# Patient Record
Sex: FEMALE | Race: WHITE | NOT HISPANIC OR LATINO | Employment: UNEMPLOYED | URBAN - METROPOLITAN AREA
[De-identification: names, ages, dates, MRNs, and addresses within clinical notes are randomized per-mention and may not be internally consistent; named-entity substitution may affect disease eponyms.]

---

## 2017-03-08 ENCOUNTER — OFFICE VISIT (OUTPATIENT)
Dept: FAMILY MEDICINE CLINIC | Facility: CLINIC | Age: 37
End: 2017-03-08

## 2017-03-08 VITALS
RESPIRATION RATE: 16 BRPM | DIASTOLIC BLOOD PRESSURE: 62 MMHG | TEMPERATURE: 98.2 F | SYSTOLIC BLOOD PRESSURE: 118 MMHG | OXYGEN SATURATION: 98 % | WEIGHT: 204 LBS | HEIGHT: 64 IN | BODY MASS INDEX: 34.83 KG/M2 | HEART RATE: 62 BPM

## 2017-03-08 DIAGNOSIS — R11.2 NON-INTRACTABLE VOMITING WITH NAUSEA, UNSPECIFIED VOMITING TYPE: ICD-10-CM

## 2017-03-08 DIAGNOSIS — R53.82 CHRONIC FATIGUE: ICD-10-CM

## 2017-03-08 DIAGNOSIS — F32.A ANXIETY AND DEPRESSION: ICD-10-CM

## 2017-03-08 DIAGNOSIS — Z00.00 ANNUAL PHYSICAL EXAM: Primary | ICD-10-CM

## 2017-03-08 DIAGNOSIS — F41.9 ANXIETY AND DEPRESSION: ICD-10-CM

## 2017-03-08 DIAGNOSIS — G43.009 MIGRAINE WITHOUT AURA AND WITHOUT STATUS MIGRAINOSUS, NOT INTRACTABLE: ICD-10-CM

## 2017-03-08 DIAGNOSIS — Z82.0 FAMILY HISTORY OF HUNTINGTON'S DISEASE: ICD-10-CM

## 2017-03-08 LAB
B-HCG UR QL: NEGATIVE
INTERNAL NEGATIVE CONTROL: NEGATIVE
INTERNAL POSITIVE CONTROL: POSITIVE
Lab: NORMAL

## 2017-03-08 PROCEDURE — 99385 PREV VISIT NEW AGE 18-39: CPT | Performed by: PHYSICIAN ASSISTANT

## 2017-03-08 PROCEDURE — 81025 URINE PREGNANCY TEST: CPT | Performed by: PHYSICIAN ASSISTANT

## 2017-03-08 RX ORDER — BACLOFEN 10 MG/1
10 TABLET ORAL 3 TIMES DAILY
Qty: 60 TABLET | Refills: 2 | Status: SHIPPED | OUTPATIENT
Start: 2017-03-08 | End: 2017-04-05 | Stop reason: SDUPTHER

## 2017-03-08 RX ORDER — TRAZODONE HYDROCHLORIDE 50 MG/1
50 TABLET ORAL NIGHTLY
Qty: 30 TABLET | Refills: 2 | Status: SHIPPED | OUTPATIENT
Start: 2017-03-08 | End: 2017-04-05 | Stop reason: DRUGHIGH

## 2017-03-08 RX ORDER — ONDANSETRON 4 MG/1
4 TABLET, FILM COATED ORAL EVERY 8 HOURS PRN
Qty: 15 TABLET | Refills: 0 | Status: SHIPPED | OUTPATIENT
Start: 2017-03-08 | End: 2017-04-05 | Stop reason: SDUPTHER

## 2017-03-08 NOTE — PROGRESS NOTES
Subjective   Ru Rico is a 36 y.o. female.   Chief Complaint   Patient presents with   • Annual Exam     Get established. Patient is fasting for labs.        History of Present Illness     Ru is a 36-year-old female who presents to establish as new patient at today's office visit.  She is also here for annual physical examination.  I have reviewed her health history form with her and will try to obtain her prior medical records for my review.  Ru has a history of anxiety and depression.  Currently not taking any medications.  She has a history of reflux which she is using omeprazole daily.  Ru has a history of asthma and will use her rescue inhaler as needed.  Ru sees, Dr. Jimenez for her gynecological needs.  Last LMP was 2017.  Ru recently had a cholecystectomy in 2016.  She has a history of kidney stones with stent placement with Dr. Jamar Doe in 2016. Ru has been having headaches 3-4 times a week.  Describes the headaches as a throbbing sensation especially by eyes.  The headaches may last a few hours to days.  She has had photophobia,phonophobia,hard to get out of bed,motion sickness,nausea and vomiting.  She has been taking OTC  mg as needed.  She has not had a migraine workup or Neurology appointments.  Ru denied any head injury/tramua. Ru's father,brother and paternal grandfather have been diagnosed with [place]'s disease.  She has not had any testing for this disease. Sleep and dark room helps her headaches.  Last eye exam was February with normal results.   Caffeine is rare. She has increased stress at home. 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  Her grandmother got severely ill due to GI bleeding and was placed in hospice care in January.  She  a few weeks later.  States she is still trying to get over the grieving process.  Her  is not supportive as he should be.  She has not seen a  "therapist or grief counselor for her grandmother's passing.  Ru has been on Prozac, Wellbutrin,Celexa,hydroxyzine and Lexapro in past without relief of anxiety and depression.  Denied any suicidal or homicidal thoughts. Ru denied any chest pain, shortness of air, dizziness, vision changes, upper respiratory symptoms or swelling of her ankles. Denied any injury/trauma to head. Her appetite has been slightly healthy and sleep has been restless due to always thinking.  She has tried over-the-counter melatonin and Benadryl for sleep without relief of her symptoms.      The following portions of the patient's history were reviewed and updated as appropriate: allergies, current medications, past family history, past medical history, past social history and past surgical history.    Review of Systems   Constitutional: Negative.  Negative for chills, fatigue and fever.   Eyes: Positive for photophobia. Negative for visual disturbance.   Respiratory: Negative.  Negative for cough, shortness of breath and wheezing.    Cardiovascular: Negative.  Negative for chest pain, palpitations and leg swelling.   Gastrointestinal: Positive for nausea and vomiting. Negative for constipation and diarrhea.   Endocrine: Negative.    Genitourinary: Negative.    Musculoskeletal: Negative.    Skin: Negative.    Allergic/Immunologic: Negative.    Neurological: Positive for headaches. Negative for dizziness and light-headedness.   Hematological: Negative.    Psychiatric/Behavioral: Negative for sleep disturbance and suicidal ideas. The patient is nervous/anxious.    All other systems reviewed and are negative.    Vitals:    03/08/17 0802   BP: 118/62   BP Location: Right arm   Patient Position: Sitting   Cuff Size: Adult   Pulse: 62   Resp: 16   Temp: 98.2 °F (36.8 °C)   TempSrc: Oral   SpO2: 98%   Weight: 204 lb (92.5 kg)   Height: 64\" (162.6 cm)     PHQ-9 Depression Screening 3/8/2017   Little interest or pleasure in doing things 2 "   Feeling down, depressed, or hopeless 1   Trouble falling or staying asleep, or sleeping too much 3   Feeling tired or having little energy 3   Poor appetite or overeating 3   Feeling bad about yourself - or that you are a failure or have let yourself or your family down 2   Trouble concentrating on things, such as reading the newspaper or watching television 1   Moving or speaking so slowly that other people could have noticed. Or the opposite - being so fidgety or restless that you have been moving around a lot more than usual 3   Thoughts that you would be better off dead, or of hurting yourself in some way 0   PHQ-9 Total Score 18     Body mass index is 35.02 kg/(m^2).    No Known Allergies      Objective   Physical Exam   Constitutional: She is oriented to person, place, and time. Vital signs are normal. She appears well-developed and well-nourished.   HENT:   Head: Normocephalic and atraumatic.   Right Ear: Hearing, tympanic membrane, external ear and ear canal normal.   Left Ear: Hearing, tympanic membrane, external ear and ear canal normal.   Nose: Nose normal. Right sinus exhibits no maxillary sinus tenderness and no frontal sinus tenderness. Left sinus exhibits no maxillary sinus tenderness and no frontal sinus tenderness.   Mouth/Throat: Uvula is midline, oropharynx is clear and moist and mucous membranes are normal.   Eyes: Conjunctivae, EOM and lids are normal. Pupils are equal, round, and reactive to light.   Fundoscopic exam:       The right eye shows no papilledema.        The left eye shows no papilledema.   Neck: Trachea normal and phonation normal. Neck supple. No edema present. No thyromegaly present.   Cardiovascular: Normal rate, regular rhythm, S1 normal, S2 normal, normal heart sounds and normal pulses.    Pulmonary/Chest: Effort normal and breath sounds normal.   Abdominal: Soft. Normal appearance, normal aorta and bowel sounds are normal. There is no hepatomegaly. There is no tenderness.    Lymphadenopathy:     She has no cervical adenopathy.   Neurological: She is alert and oriented to person, place, and time. She has normal strength. No cranial nerve deficit or sensory deficit.   Reflex Scores:       Patellar reflexes are 2+ on the right side and 2+ on the left side.  Skin: Skin is warm, dry and intact.   Psychiatric: Her speech is normal and behavior is normal. Judgment and thought content normal. Her mood appears anxious. Cognition and memory are normal. She exhibits a depressed mood.   Crying off and on       Results for orders placed or performed in visit on 03/08/17   POC Pregnancy, Urine   Result Value Ref Range    HCG, Urine, QL Negative Negative    Lot Number [id number]     Internal Positive Control Positive     Internal Negative Control Negative      Assessment/Plan   Ru was seen today for annual exam.    Diagnoses and all orders for this visit:    Annual physical exam  -     CBC & Differential; Future  -     Comprehensive Metabolic Panel; Future  -     Lipid Panel With LDL / HDL Ratio; Future  -     CBC & Differential  -     Comprehensive Metabolic Panel  -     Lipid Panel With LDL / HDL Ratio    Anxiety and depression  -     traZODone (DESYREL) 50 MG tablet; Take 1 tablet by mouth Every Night.    Migraine without aura and without status migrainosus, not intractable  -     MRI Brain Without Contrast; Future  -     Ambulatory Referral to Neurology  -     POC Pregnancy, Urine  -     ondansetron (ZOFRAN) 4 MG tablet; Take 1 tablet by mouth Every 8 (Eight) Hours As Needed for Nausea or Vomiting.  -     baclofen (LIORESAL) 10 MG tablet; Take 1 tablet by mouth 3 (Three) Times a Day. As needed    Non-intractable vomiting with nausea, unspecified vomiting type  -     MRI Brain Without Contrast; Future  -     POC Pregnancy, Urine    Chronic fatigue  -     Vitamin B12; Future  -     Folate; Future  -     Vitamin D 25 Hydroxy; Future  -     Thyroid Panel With TSH; Future  -     POC Pregnancy,  Urine  -     Vitamin B12  -     Folate  -     Vitamin D 25 Hydroxy  -     Thyroid Panel With TSH    Family history of Lindsey's disease  -     Ambulatory Referral to Neurology      1.  New annual physical examination: I have reviewed Ru's health history form with her at today's office visit.  I will try to obtain her medical records for my review.  She is fasting at today's office visit will have a CBC, CMP and a lipid profile.  Ru will be notified of test results when completed.  2.  New anxiety with depression: I have reviewed her depression screening test at today's office visit.  I will start Ru on generic trazodone 50 mg to take at bedtime.  I've asked her to return to office in 3-4 weeks for reevaluation.  I'll consider possible referral to psychiatrist/therapist in future if warranted.  Encouraged her to seek grief counseling through the hospice organization due to her recent grandmother's passing of Dementia.  3.  New fatigue: Ru has been having fatigue symptoms off and on for the past 3-6 months.  Ru will have a in office vitamin B12/folate, vitamin D level and thyroid profile with TSH collected at today's office visit.  She'll be notified of test results when completed.  4.  New migraine headaches with vomiting/nausea and family history of [place]'s disease: Ru has been having frequent headaches.  I will schedule an MRI of brain without contrast for further evaluation.  Unable to start Topamax at this time due to her birth control medication interfering with the Topamax.  I have prescribed generic baclofen and Zofran prescriptions to pharmacy.  I'll consider possible Imitrex medication after laboratory results are normal.  I've asked her to start over-the-counter magnesium 1 tablet daily.  Continue her over-the-counter ibuprofen for now.  Ru has a strong family history of Lindsey Disease  with her father and brother.  She has symptoms of migraines.  I will schedule a referral to  neurology for further evaluation.  She will be notified of test results when completed.    Dragon transcription disclaimer    Much of this encounter note is an electronic transcription/translation of spoken language to printed text.  The electronic translation of spoken language may permit erroneous, or at times, nonsensical words or phrases to be inadvertently transcribed.  Although I have reviewed the note for such errors, some may still exist.    Ruthy Rubin PA-C  Family Practice        Dragon transcription disclaimer    Much of this encounter note is an electronic transcription/translation of spoken language to printed text.  The electronic translation of spoken language may permit erroneous, or at times, nonsensical words or phrases to be inadvertently transcribed.  Although I have reviewed the note for such errors, some may still exist.    Ruthy Rubin PA-C  Family Practice

## 2017-03-09 ENCOUNTER — TELEPHONE (OUTPATIENT)
Dept: FAMILY MEDICINE CLINIC | Facility: CLINIC | Age: 37
End: 2017-03-09

## 2017-03-09 LAB
25(OH)D3+25(OH)D2 SERPL-MCNC: 18.8 NG/ML
ALBUMIN SERPL-MCNC: 4 G/DL (ref 3.5–5.2)
ALBUMIN/GLOB SERPL: 1.1 G/DL
ALP SERPL-CCNC: 81 U/L (ref 40–129)
ALT SERPL-CCNC: 16 U/L (ref 5–33)
AST SERPL-CCNC: 22 U/L (ref 5–32)
BASOPHILS # BLD AUTO: 0.06 10*3/MM3 (ref 0–0.2)
BASOPHILS NFR BLD AUTO: 0.7 % (ref 0–2)
BILIRUB SERPL-MCNC: 0.5 MG/DL (ref 0.2–1.2)
BUN SERPL-MCNC: 12 MG/DL (ref 6–20)
BUN/CREAT SERPL: 15.4 (ref 7–25)
CALCIUM SERPL-MCNC: 9.3 MG/DL (ref 8.6–10.5)
CHLORIDE SERPL-SCNC: 97 MMOL/L (ref 98–107)
CHOLEST SERPL-MCNC: 205 MG/DL (ref 0–200)
CO2 SERPL-SCNC: 24.4 MMOL/L (ref 22–29)
CREAT SERPL-MCNC: 0.78 MG/DL (ref 0.57–1)
EOSINOPHIL # BLD AUTO: 0.17 10*3/MM3 (ref 0.1–0.3)
EOSINOPHIL NFR BLD AUTO: 1.9 % (ref 0–4)
ERYTHROCYTE [DISTWIDTH] IN BLOOD BY AUTOMATED COUNT: 13.7 % (ref 11.5–14.5)
FOLATE SERPL-MCNC: 7.83 NG/ML (ref 4.78–24.2)
FT4I SERPL CALC-MCNC: 2 (ref 1.2–4.9)
GLOBULIN SER CALC-MCNC: 3.5 GM/DL
GLUCOSE SERPL-MCNC: 87 MG/DL (ref 65–99)
HCT VFR BLD AUTO: 42.4 % (ref 37–47)
HDLC SERPL-MCNC: 67 MG/DL (ref 40–60)
HGB BLD-MCNC: 13.3 G/DL (ref 12–16)
IMM GRANULOCYTES # BLD: 0.04 10*3/MM3 (ref 0–0.03)
IMM GRANULOCYTES NFR BLD: 0.4 % (ref 0–0.5)
LDLC SERPL CALC-MCNC: 87 MG/DL (ref 0–100)
LDLC/HDLC SERPL: 1.3 {RATIO}
LYMPHOCYTES # BLD AUTO: 2.15 10*3/MM3 (ref 0.6–4.8)
LYMPHOCYTES NFR BLD AUTO: 24.1 % (ref 20–45)
MCH RBC QN AUTO: 26.4 PG (ref 27–31)
MCHC RBC AUTO-ENTMCNC: 31.4 G/DL (ref 31–37)
MCV RBC AUTO: 84.1 FL (ref 81–99)
MONOCYTES # BLD AUTO: 0.67 10*3/MM3 (ref 0–1)
MONOCYTES NFR BLD AUTO: 7.5 % (ref 3–8)
NEUTROPHILS # BLD AUTO: 5.84 10*3/MM3 (ref 1.5–8.3)
NEUTROPHILS NFR BLD AUTO: 65.4 % (ref 45–70)
NRBC BLD AUTO-RTO: 0 /100 WBC (ref 0–0)
PLATELET # BLD AUTO: 359 10*3/MM3 (ref 140–500)
POTASSIUM SERPL-SCNC: 4.5 MMOL/L (ref 3.5–5.2)
PROT SERPL-MCNC: 7.5 G/DL (ref 6–8.5)
RBC # BLD AUTO: 5.04 10*6/MM3 (ref 4.2–5.4)
SODIUM SERPL-SCNC: 137 MMOL/L (ref 136–145)
T3RU NFR SERPL: 18 % (ref 24–39)
T4 SERPL-MCNC: 11.2 UG/DL (ref 4.5–12)
TRIGL SERPL-MCNC: 254 MG/DL (ref 0–150)
TSH SERPL DL<=0.005 MIU/L-ACNC: 1.82 UIU/ML (ref 0.45–4.5)
VIT B12 SERPL-MCNC: 781 PG/ML (ref 211–946)
VLDLC SERPL CALC-MCNC: 50.8 MG/DL (ref 7–27)
WBC # BLD AUTO: 8.93 10*3/MM3 (ref 4.8–10.8)

## 2017-03-10 ENCOUNTER — APPOINTMENT (OUTPATIENT)
Dept: GENERAL RADIOLOGY | Facility: HOSPITAL | Age: 37
End: 2017-03-10

## 2017-03-10 ENCOUNTER — HOSPITAL ENCOUNTER (EMERGENCY)
Facility: HOSPITAL | Age: 37
Discharge: HOME | End: 2017-03-10
Attending: EMERGENCY MEDICINE | Admitting: EMERGENCY MEDICINE

## 2017-03-10 VITALS
WEIGHT: 202 LBS | RESPIRATION RATE: 22 BRPM | SYSTOLIC BLOOD PRESSURE: 126 MMHG | HEART RATE: 68 BPM | BODY MASS INDEX: 33.66 KG/M2 | TEMPERATURE: 98.1 F | DIASTOLIC BLOOD PRESSURE: 91 MMHG | OXYGEN SATURATION: 97 % | HEIGHT: 65 IN

## 2017-03-10 DIAGNOSIS — S82.62XA CLOSED DISPLACED FRACTURE OF LATERAL MALLEOLUS OF LEFT FIBULA, INITIAL ENCOUNTER: Primary | ICD-10-CM

## 2017-03-10 PROCEDURE — 73620 X-RAY EXAM OF FOOT: CPT

## 2017-03-10 PROCEDURE — 73610 X-RAY EXAM OF ANKLE: CPT

## 2017-03-10 PROCEDURE — 99283 EMERGENCY DEPT VISIT LOW MDM: CPT | Performed by: EMERGENCY MEDICINE

## 2017-03-10 PROCEDURE — 99282 EMERGENCY DEPT VISIT SF MDM: CPT

## 2017-03-10 RX ORDER — NAPROXEN 500 MG/1
500 TABLET ORAL 2 TIMES DAILY WITH MEALS
Qty: 20 TABLET | Refills: 0 | Status: SHIPPED | OUTPATIENT
Start: 2017-03-10 | End: 2017-04-05

## 2017-03-10 NOTE — ED PROVIDER NOTES
Subjective   Patient is a 36 y.o. female presenting with lower extremity pain.   History provided by:  Patient  Lower Extremity Issue   Location:  Ankle  Time since incident:  30 minutes  Injury: yes    Mechanism of injury: fall    Fall:     Fall occurred:  Walking    Impact surface:  Stairs  Ankle location:  L ankle  Pain details:     Quality:  Throbbing    Radiates to:  Does not radiate    Severity:  Moderate    Onset quality:  Sudden    Timing:  Constant  Chronicity:  New  Dislocation: no    Foreign body present:  No foreign bodies  Prior injury to area:  No  Relieved by:  None tried  Worsened by:  Bearing weight  Ineffective treatments:  None tried  Risk factors: no concern for non-accidental trauma, no frequent fractures, no known bone disorder, no obesity and no recent illness        Review of Systems   Respiratory: Negative.    Neurological: Negative.        Past Medical History   Diagnosis Date   • Anxiety    • Asthma    • Depression    • GERD (gastroesophageal reflux disease)    • Headache    • Menstrual problem        No Known Allergies    Past Surgical History   Procedure Laterality Date   • Cholecystectomy     • Cystoscopy w/ ureteral stent placement Right 12/2/2016     Procedure: CYSTOSCOPY RIGHT  URETERAL CATHETER/STENT INSERTION BASKET EXTRACTION, LASER LITHOTRIPSY  ;  Surgeon: Christophe Doe MD;  [facility];  Service:        Family History   Problem Relation Age of Onset   • Heart disease Mother    • Hypertension Mother    • Depression Mother    • Anxiety disorder Mother    • Bipolar disorder Brother    • Heart disease Maternal Grandmother    • Hypertension Maternal Grandmother    • Cancer Maternal Grandmother    • Thyroid disease Maternal Grandmother    • Lung disease Maternal Grandmother    • Depression Maternal Grandmother    • Glaucoma Maternal Grandmother    • Anxiety disorder Maternal Grandmother    • Macular degeneration Maternal Grandmother        Social History     Social  History   • Marital status:      Spouse name: N/A   • Number of children: N/A   • Years of education: N/A     Social History Main Topics   • Smoking status: Never Smoker   • Smokeless tobacco: None   • Alcohol use Yes   • Drug use: No   • Sexual activity: Not Asked     Other Topics Concern   • None     Social History Narrative           Objective   Physical Exam   Constitutional: She is oriented to person, place, and time. She appears well-developed and well-nourished.   HENT:   Head: Normocephalic.   Eyes: EOM are normal. Pupils are equal, round, and reactive to light.   Neck: Normal range of motion. Neck supple.   Pulmonary/Chest: Effort normal.   Abdominal: Soft.   Musculoskeletal: Normal range of motion.        Left ankle: She exhibits normal range of motion, no swelling, no ecchymosis, no deformity, no laceration and normal pulse. Tenderness. Lateral malleolus tenderness found. No head of 5th metatarsal and no proximal fibula tenderness found. Achilles tendon exhibits no pain.   No TTP over 5th MT        Neurological: She is alert and oriented to person, place, and time.   Skin: Skin is warm and dry.   Psychiatric: She has a normal mood and affect.   Nursing note and vitals reviewed.      Procedures         ED Course  ED Course                  MDM  Number of Diagnoses or Management Options  Closed displaced fracture of lateral malleolus of left fibula, initial encounter:      Amount and/or Complexity of Data Reviewed  Tests in the radiology section of CPT®: ordered and reviewed  Discussion of test results with the performing providers: yes  Independent visualization of images, tracings, or specimens: yes    Risk of Complications, Morbidity, and/or Mortality  Presenting problems: low  Diagnostic procedures: moderate  Management options: moderate    Patient Progress  Patient progress: stable      Final diagnoses:   Closed displaced fracture of lateral malleolus of left fibula, initial encounter             Tal Parsons MD  03/10/17 1520

## 2017-03-14 ENCOUNTER — HOSPITAL ENCOUNTER (OUTPATIENT)
Dept: MRI IMAGING | Facility: HOSPITAL | Age: 37
Discharge: HOME | End: 2017-03-14
Admitting: PHYSICIAN ASSISTANT

## 2017-03-14 DIAGNOSIS — R11.2 NON-INTRACTABLE VOMITING WITH NAUSEA, UNSPECIFIED VOMITING TYPE: ICD-10-CM

## 2017-03-14 DIAGNOSIS — G43.009 MIGRAINE WITHOUT AURA AND WITHOUT STATUS MIGRAINOSUS, NOT INTRACTABLE: ICD-10-CM

## 2017-03-14 PROCEDURE — 70551 MRI BRAIN STEM W/O DYE: CPT

## 2017-03-15 DIAGNOSIS — J32.0 CHRONIC MAXILLARY SINUSITIS: Primary | ICD-10-CM

## 2017-03-15 RX ORDER — BROMPHENIRAMINE MALEATE, PSEUDOEPHEDRINE HYDROCHLORIDE, AND DEXTROMETHORPHAN HYDROBROMIDE 2; 30; 10 MG/5ML; MG/5ML; MG/5ML
5 SYRUP ORAL 4 TIMES DAILY PRN
Qty: 118 ML | Refills: 0 | Status: SHIPPED | OUTPATIENT
Start: 2017-03-15 | End: 2017-03-19 | Stop reason: SDUPTHER

## 2017-03-15 RX ORDER — CEFDINIR 300 MG/1
300 CAPSULE ORAL 2 TIMES DAILY
Qty: 20 CAPSULE | Refills: 0 | Status: SHIPPED | OUTPATIENT
Start: 2017-03-15 | End: 2017-04-05

## 2017-03-19 DIAGNOSIS — J32.0 CHRONIC MAXILLARY SINUSITIS: ICD-10-CM

## 2017-03-20 RX ORDER — BROMPHENIRAMINE MALEATE, PSEUDOEPHEDRINE HYDROCHLORIDE, AND DEXTROMETHORPHAN HYDROBROMIDE 2; 30; 10 MG/5ML; MG/5ML; MG/5ML
SYRUP ORAL
Qty: 118 ML | Refills: 0 | Status: SHIPPED | OUTPATIENT
Start: 2017-03-20 | End: 2017-03-31 | Stop reason: SDUPTHER

## 2017-03-29 RX ORDER — SUMATRIPTAN 50 MG/1
TABLET, FILM COATED ORAL
Qty: 9 TABLET | Refills: 3 | Status: SHIPPED | OUTPATIENT
Start: 2017-03-29 | End: 2017-05-10 | Stop reason: ALTCHOICE

## 2017-03-31 DIAGNOSIS — J32.0 CHRONIC MAXILLARY SINUSITIS: ICD-10-CM

## 2017-04-03 RX ORDER — BROMPHENIRAMINE MALEATE, PSEUDOEPHEDRINE HYDROCHLORIDE, AND DEXTROMETHORPHAN HYDROBROMIDE 2; 30; 10 MG/5ML; MG/5ML; MG/5ML
SYRUP ORAL
Qty: 118 ML | Refills: 0 | Status: SHIPPED | OUTPATIENT
Start: 2017-04-03 | End: 2017-04-25 | Stop reason: SDUPTHER

## 2017-04-05 ENCOUNTER — OFFICE VISIT (OUTPATIENT)
Dept: FAMILY MEDICINE CLINIC | Facility: CLINIC | Age: 37
End: 2017-04-05

## 2017-04-05 VITALS
WEIGHT: 206.6 LBS | DIASTOLIC BLOOD PRESSURE: 60 MMHG | SYSTOLIC BLOOD PRESSURE: 110 MMHG | HEART RATE: 68 BPM | BODY MASS INDEX: 34.42 KG/M2 | HEIGHT: 65 IN | TEMPERATURE: 97.7 F | OXYGEN SATURATION: 100 %

## 2017-04-05 DIAGNOSIS — F32.A ANXIETY AND DEPRESSION: ICD-10-CM

## 2017-04-05 DIAGNOSIS — R11.2 NON-INTRACTABLE VOMITING WITH NAUSEA, UNSPECIFIED VOMITING TYPE: Primary | ICD-10-CM

## 2017-04-05 DIAGNOSIS — F41.9 ANXIETY AND DEPRESSION: ICD-10-CM

## 2017-04-05 DIAGNOSIS — G43.009 MIGRAINE WITHOUT AURA AND WITHOUT STATUS MIGRAINOSUS, NOT INTRACTABLE: ICD-10-CM

## 2017-04-05 PROCEDURE — 99213 OFFICE O/P EST LOW 20 MIN: CPT | Performed by: PHYSICIAN ASSISTANT

## 2017-04-05 RX ORDER — BACLOFEN 10 MG/1
10 TABLET ORAL 3 TIMES DAILY
Qty: 60 TABLET | Refills: 2 | Status: SHIPPED | OUTPATIENT
Start: 2017-04-05 | End: 2018-11-01

## 2017-04-05 RX ORDER — TRAZODONE HYDROCHLORIDE 100 MG/1
100 TABLET ORAL NIGHTLY
Qty: 30 TABLET | Refills: 2 | Status: SHIPPED | OUTPATIENT
Start: 2017-04-05 | End: 2018-11-01

## 2017-04-05 RX ORDER — TOPIRAMATE 25 MG/1
25 TABLET ORAL DAILY
Qty: 30 TABLET | Refills: 0 | Status: SHIPPED | OUTPATIENT
Start: 2017-04-05 | End: 2017-04-25 | Stop reason: SDUPTHER

## 2017-04-05 RX ORDER — ONDANSETRON 4 MG/1
4 TABLET, FILM COATED ORAL EVERY 8 HOURS PRN
Qty: 15 TABLET | Refills: 0 | Status: SHIPPED | OUTPATIENT
Start: 2017-04-05 | End: 2017-04-26 | Stop reason: SDUPTHER

## 2017-04-05 NOTE — PROGRESS NOTES
Subjective   Ru Rico is a 36 y.o. female.   Chief Complaint   Patient presents with   • Migraine   • Depression   • Follow-up       History of Present Illness     Ru is a 36-year-old female who presents for depression and migraine management.  She has gained 4 pounds since March 10, 2017. Ru has been having headaches daily.  She has been using Imitrex daily.  She has not seen the neurologist yet.  The baclofen has helped slightly.  Ru is having an IUD placed today for birth control. She has been having nausea/vomiting with her headaches.  She has been taking Zofran as needed.  States the trazodone has helped at first for her sleeping. States that it has not been helping as well lately.  Denied any suicidal or homicidal thoughts.  She has not heard about her neurology appointment at this time.  She has been taken her Imitrex daily to every other day without relief of headache.  No vision changes,dizziness or recent head injury.    The following portions of the patient's history were reviewed and updated as appropriate: allergies, current medications, past family history, past medical history, past social history and past surgical history.    Review of Systems   Constitutional: Negative.  Negative for chills, fatigue, fever and unexpected weight change.   Eyes: Negative.  Negative for photophobia and visual disturbance.   Respiratory: Negative.  Negative for cough, shortness of breath and wheezing.    Cardiovascular: Negative.  Negative for chest pain, palpitations and leg swelling.   Gastrointestinal: Positive for nausea and vomiting. Negative for abdominal pain, constipation and diarrhea.   Endocrine: Negative.    Genitourinary: Negative.    Musculoskeletal: Negative.    Skin: Negative.    Allergic/Immunologic: Negative.    Neurological: Positive for headaches. Negative for dizziness and light-headedness.   Hematological: Negative.    Psychiatric/Behavioral: Positive for sleep disturbance. Negative  "for suicidal ideas.   All other systems reviewed and are negative.      Vitals:    04/05/17 0840   BP: 110/60   Pulse: 68   Temp: 97.7 °F (36.5 °C)   SpO2: 100%   Weight: 206 lb 9.6 oz (93.7 kg)   Height: 65\" (165.1 cm)     Body mass index is 34.38 kg/(m^2).   No Known Allergies     Wt Readings from Last 3 Encounters:   04/05/17 206 lb 9.6 oz (93.7 kg)   03/10/17 202 lb (91.6 kg)   03/08/17 204 lb (92.5 kg)       BP Readings from Last 3 Encounters:   04/05/17 110/60   03/10/17 126/91   03/08/17 118/62     Objective   Physical Exam   Constitutional: She is oriented to person, place, and time. Vital signs are normal. She appears well-developed and well-nourished.   HENT:   Head: Normocephalic and atraumatic.   Right Ear: Hearing, tympanic membrane, external ear and ear canal normal.   Left Ear: Hearing, tympanic membrane, external ear and ear canal normal.   Nose: Nose normal. Right sinus exhibits no maxillary sinus tenderness and no frontal sinus tenderness. Left sinus exhibits no maxillary sinus tenderness and no frontal sinus tenderness.   Mouth/Throat: Uvula is midline, oropharynx is clear and moist and mucous membranes are normal.   Eyes: Conjunctivae, EOM and lids are normal. Pupils are equal, round, and reactive to light.   Neck: Trachea normal and phonation normal. Neck supple. No edema present. No thyromegaly present.   Cardiovascular: Normal rate, regular rhythm, S1 normal, S2 normal, normal heart sounds and normal pulses.    Pulmonary/Chest: Effort normal and breath sounds normal.   Abdominal: Soft. Normal appearance, normal aorta and bowel sounds are normal. There is no hepatomegaly. There is no tenderness.   Lymphadenopathy:     She has no cervical adenopathy.   Neurological: She is alert and oriented to person, place, and time.   Skin: Skin is warm, dry and intact.   Psychiatric: She has a normal mood and affect. Her speech is normal and behavior is normal. Judgment and thought content normal. Cognition " and memory are normal.       Assessment/Plan   Ru was seen today for migraine, depression and follow-up.    Diagnoses and all orders for this visit:    Non-intractable vomiting with nausea, unspecified vomiting type    Migraine without aura and without status migrainosus, not intractable  -     topiramate (TOPAMAX) 25 MG tablet; Take 1 tablet by mouth Daily.  -     baclofen (LIORESAL) 10 MG tablet; Take 1 tablet by mouth 3 (Three) Times a Day. As needed  -     ondansetron (ZOFRAN) 4 MG tablet; Take 1 tablet by mouth Every 8 (Eight) Hours As Needed for Nausea or Vomiting.    Anxiety and depression  -     traZODone (DESYREL) 100 MG tablet; Take 1 tablet by mouth Every Night.      1.  Uncontrolled anxiety but depression: I will increase her trazodone to 100 mg a day.  A new prescription was sent to pharmacy.  Ru will return to office in 4-6 weeks for reevaluation.  2.  Uncontrolled migraine headaches with nausea/vomiting: I will start her on Topamax 25 mg a day.  She was instructed to use backup birth control with her medication.  She voiced understanding.  I have refilled baclofen and Zofran prescriptions to pharmacy.  She will keep her appointment with neurology when made.  I've asked her to return to office in the next 4-6 weeks for reevaluation.      [place] transcription disclaimer    Much of this encounter note is an electronic transcription/translation of spoken language to printed text.  The electronic translation of spoken language may permit erroneous, or at times, nonsensical words or phrases to be inadvertently transcribed.  Although I have reviewed the note for such errors, some may still exist.    Ruthy Rubin PA-C  Family Practice

## 2017-04-12 ENCOUNTER — TRANSCRIBE ORDERS (OUTPATIENT)
Dept: ADMINISTRATIVE | Facility: HOSPITAL | Age: 37
End: 2017-04-12

## 2017-04-12 DIAGNOSIS — M25.572 LEFT ANKLE PAIN, UNSPECIFIED CHRONICITY: Primary | ICD-10-CM

## 2017-04-18 ENCOUNTER — HOSPITAL ENCOUNTER (OUTPATIENT)
Dept: CT IMAGING | Facility: HOSPITAL | Age: 37
Discharge: HOME | End: 2017-04-18
Attending: ORTHOPAEDIC SURGERY | Admitting: ORTHOPAEDIC SURGERY

## 2017-04-18 DIAGNOSIS — M25.572 LEFT ANKLE PAIN, UNSPECIFIED CHRONICITY: ICD-10-CM

## 2017-04-18 PROCEDURE — 73700 CT LOWER EXTREMITY W/O DYE: CPT

## 2017-04-25 DIAGNOSIS — J32.0 CHRONIC MAXILLARY SINUSITIS: ICD-10-CM

## 2017-04-25 DIAGNOSIS — G43.009 MIGRAINE WITHOUT AURA AND WITHOUT STATUS MIGRAINOSUS, NOT INTRACTABLE: ICD-10-CM

## 2017-04-25 RX ORDER — TOPIRAMATE 25 MG/1
TABLET ORAL
Qty: 30 TABLET | Refills: 0 | Status: SHIPPED | OUTPATIENT
Start: 2017-04-25 | End: 2017-05-03 | Stop reason: SINTOL

## 2017-04-26 DIAGNOSIS — G43.009 MIGRAINE WITHOUT AURA AND WITHOUT STATUS MIGRAINOSUS, NOT INTRACTABLE: ICD-10-CM

## 2017-04-26 RX ORDER — BROMPHENIRAMINE MALEATE, PSEUDOEPHEDRINE HYDROCHLORIDE, AND DEXTROMETHORPHAN HYDROBROMIDE 2; 30; 10 MG/5ML; MG/5ML; MG/5ML
SYRUP ORAL
Qty: 118 ML | Refills: 0 | Status: SHIPPED | OUTPATIENT
Start: 2017-04-26 | End: 2018-11-01

## 2017-04-26 RX ORDER — ONDANSETRON 4 MG/1
4 TABLET, FILM COATED ORAL EVERY 8 HOURS PRN
Qty: 15 TABLET | Refills: 0 | Status: SHIPPED | OUTPATIENT
Start: 2017-04-26 | End: 2018-11-01

## 2017-05-03 ENCOUNTER — OFFICE VISIT (OUTPATIENT)
Dept: FAMILY MEDICINE CLINIC | Facility: CLINIC | Age: 37
End: 2017-05-03

## 2017-05-03 VITALS
TEMPERATURE: 98.4 F | DIASTOLIC BLOOD PRESSURE: 80 MMHG | WEIGHT: 206 LBS | HEIGHT: 65 IN | SYSTOLIC BLOOD PRESSURE: 112 MMHG | RESPIRATION RATE: 18 BRPM | BODY MASS INDEX: 34.32 KG/M2 | OXYGEN SATURATION: 100 % | HEART RATE: 82 BPM

## 2017-05-03 DIAGNOSIS — G43.009 MIGRAINE WITHOUT AURA AND WITHOUT STATUS MIGRAINOSUS, NOT INTRACTABLE: Primary | ICD-10-CM

## 2017-05-03 PROCEDURE — 99213 OFFICE O/P EST LOW 20 MIN: CPT | Performed by: PHYSICIAN ASSISTANT

## 2017-05-03 RX ORDER — TOPIRAMATE 25 MG/1
CAPSULE, EXTENDED RELEASE ORAL
Qty: 7 CAPSULE | Refills: 0 | COMMUNITY
Start: 2017-05-03 | End: 2017-05-10 | Stop reason: ALTCHOICE

## 2017-05-03 RX ORDER — TOPIRAMATE 50 MG/1
CAPSULE, EXTENDED RELEASE ORAL
Qty: 7 CAPSULE | Refills: 0 | COMMUNITY
Start: 2017-05-03 | End: 2017-05-10 | Stop reason: ALTCHOICE

## 2017-05-03 RX ORDER — METHYLPREDNISOLONE 4 MG/1
TABLET ORAL
Qty: 21 TABLET | Refills: 0 | Status: SHIPPED | OUTPATIENT
Start: 2017-05-03 | End: 2018-11-01

## 2017-05-10 ENCOUNTER — OFFICE VISIT (OUTPATIENT)
Dept: NEUROLOGY | Facility: CLINIC | Age: 37
End: 2017-05-10

## 2017-05-10 VITALS
OXYGEN SATURATION: 98 % | DIASTOLIC BLOOD PRESSURE: 68 MMHG | WEIGHT: 205 LBS | SYSTOLIC BLOOD PRESSURE: 120 MMHG | HEART RATE: 74 BPM | HEIGHT: 65 IN | BODY MASS INDEX: 34.16 KG/M2

## 2017-05-10 DIAGNOSIS — R41.3 MEMORY IMPAIRMENT: ICD-10-CM

## 2017-05-10 DIAGNOSIS — F32.A ANXIETY AND DEPRESSION: ICD-10-CM

## 2017-05-10 DIAGNOSIS — F41.9 ANXIETY AND DEPRESSION: ICD-10-CM

## 2017-05-10 DIAGNOSIS — G43.019 INTRACTABLE MIGRAINE WITHOUT AURA AND WITHOUT STATUS MIGRAINOSUS: ICD-10-CM

## 2017-05-10 DIAGNOSIS — Z82.0 FAMILY HISTORY OF HUNTINGTON'S DISEASE: ICD-10-CM

## 2017-05-10 PROCEDURE — 99204 OFFICE O/P NEW MOD 45 MIN: CPT | Performed by: PSYCHIATRY & NEUROLOGY

## 2017-05-10 RX ORDER — RIZATRIPTAN BENZOATE 10 MG/1
10 TABLET ORAL ONCE AS NEEDED
Qty: 12 TABLET | Refills: 5 | Status: SHIPPED | OUTPATIENT
Start: 2017-05-10 | End: 2017-06-09

## 2017-05-10 RX ORDER — DIVALPROEX SODIUM 500 MG/1
500 TABLET, EXTENDED RELEASE ORAL DAILY
Qty: 30 TABLET | Refills: 5 | Status: SHIPPED | OUTPATIENT
Start: 2017-05-10 | End: 2017-06-09

## 2017-05-10 RX ORDER — MELOXICAM 15 MG/1
15 TABLET ORAL DAILY
COMMUNITY
End: 2018-11-01

## 2017-05-22 ENCOUNTER — HOSPITAL ENCOUNTER (OUTPATIENT)
Dept: MRI IMAGING | Facility: HOSPITAL | Age: 37
Discharge: HOME | End: 2017-05-22
Attending: PSYCHIATRY & NEUROLOGY | Admitting: PSYCHIATRY & NEUROLOGY

## 2017-05-22 DIAGNOSIS — G43.019 INTRACTABLE MIGRAINE WITHOUT AURA AND WITHOUT STATUS MIGRAINOSUS: ICD-10-CM

## 2017-05-22 PROCEDURE — 70544 MR ANGIOGRAPHY HEAD W/O DYE: CPT

## 2017-06-21 ENCOUNTER — TELEPHONE (OUTPATIENT)
Dept: NEUROLOGY | Facility: CLINIC | Age: 37
End: 2017-06-21

## 2017-06-21 DIAGNOSIS — F41.9 ANXIETY: ICD-10-CM

## 2017-06-21 DIAGNOSIS — F33.9 RECURRENT MAJOR DEPRESSIVE DISORDER, REMISSION STATUS UNSPECIFIED (HCC): Primary | ICD-10-CM

## 2017-06-21 NOTE — TELEPHONE ENCOUNTER
Reviewed neuropsychological evaluation and did not show any evidence of mild cognitive impairment and dementia and the diagnosis of major depressive disorder severe with anxiety and will consider referral to a psychiatrist for depression and anxiety issues and called the patient to discuss about the results and left a message to call back.

## 2017-06-22 ENCOUNTER — TELEPHONE (OUTPATIENT)
Dept: NEUROLOGY | Facility: CLINIC | Age: 37
End: 2017-06-22

## 2017-06-22 NOTE — TELEPHONE ENCOUNTER
----- Message from Arin Alegre sent at 6/21/2017  3:08 PM EDT -----  Contact: [phone]  Patient needs  to call her back.      Patient returning your call from yesterday.

## 2017-06-22 NOTE — TELEPHONE ENCOUNTER
Called the patient to discuss about neuropsychological evaluation and left a message to call back.

## 2017-07-03 ENCOUNTER — TELEPHONE (OUTPATIENT)
Dept: NEUROLOGY | Facility: CLINIC | Age: 37
End: 2017-07-03

## 2017-07-03 DIAGNOSIS — F33.9 RECURRENT MAJOR DEPRESSIVE DISORDER, REMISSION STATUS UNSPECIFIED (HCC): Primary | ICD-10-CM

## 2017-07-03 DIAGNOSIS — F41.9 ANXIETY: ICD-10-CM

## 2017-07-03 NOTE — TELEPHONE ENCOUNTER
Called the patient and left a message.  Inform the patient that I referred her to psychiatrist to help with her depression and anxiety issues

## 2017-07-03 NOTE — TELEPHONE ENCOUNTER
----- Message from Arin Alegre sent at 6/30/2017  3:21 PM EDT -----  Contact: [phone]  Patient called Dr. Ward back on her test results.

## 2017-08-08 RX ORDER — RIZATRIPTAN BENZOATE 10 MG/1
10 TABLET ORAL ONCE AS NEEDED
Qty: 12 TABLET | Refills: 0 | Status: SHIPPED | OUTPATIENT
Start: 2017-08-08 | End: 2017-09-07

## 2017-10-02 DIAGNOSIS — K59.09 CHRONIC CONSTIPATION: Primary | ICD-10-CM

## 2018-01-28 DIAGNOSIS — K59.09 CHRONIC CONSTIPATION: ICD-10-CM

## 2018-01-29 RX ORDER — LINACLOTIDE 290 UG/1
CAPSULE, GELATIN COATED ORAL
Qty: 30 CAPSULE | Refills: 3 | Status: SHIPPED | OUTPATIENT
Start: 2018-01-29 | End: 2018-07-12 | Stop reason: SDUPTHER

## 2018-07-12 DIAGNOSIS — K59.09 CHRONIC CONSTIPATION: ICD-10-CM

## 2018-11-01 ENCOUNTER — OFFICE VISIT (OUTPATIENT)
Dept: FAMILY MEDICINE CLINIC | Facility: CLINIC | Age: 38
End: 2018-11-01

## 2018-11-01 VITALS
DIASTOLIC BLOOD PRESSURE: 70 MMHG | BODY MASS INDEX: 35.16 KG/M2 | HEIGHT: 65 IN | TEMPERATURE: 98.2 F | RESPIRATION RATE: 16 BRPM | OXYGEN SATURATION: 99 % | HEART RATE: 74 BPM | SYSTOLIC BLOOD PRESSURE: 108 MMHG | WEIGHT: 211 LBS

## 2018-11-01 DIAGNOSIS — K59.09 CHRONIC CONSTIPATION: ICD-10-CM

## 2018-11-01 DIAGNOSIS — R53.82 CHRONIC FATIGUE: ICD-10-CM

## 2018-11-01 DIAGNOSIS — R42 DIZZINESS: ICD-10-CM

## 2018-11-01 DIAGNOSIS — J02.9 ACUTE PHARYNGITIS, UNSPECIFIED ETIOLOGY: ICD-10-CM

## 2018-11-01 DIAGNOSIS — R06.02 SHORTNESS OF BREATH: Primary | ICD-10-CM

## 2018-11-01 PROBLEM — N73.9 PELVIC ABSCESS IN FEMALE: Status: ACTIVE | Noted: 2017-09-15

## 2018-11-01 PROBLEM — Z98.890 POST-OPERATIVE STATE: Status: ACTIVE | Noted: 2017-08-10

## 2018-11-01 PROBLEM — Z90.710 STATUS POST HYSTERECTOMY: Status: ACTIVE | Noted: 2017-09-14

## 2018-11-01 LAB
EXPIRATION DATE: NORMAL
INTERNAL CONTROL: NORMAL
Lab: NORMAL
S PYO AG THROAT QL: NEGATIVE

## 2018-11-01 PROCEDURE — 99214 OFFICE O/P EST MOD 30 MIN: CPT | Performed by: PHYSICIAN ASSISTANT

## 2018-11-01 PROCEDURE — 87880 STREP A ASSAY W/OPTIC: CPT | Performed by: PHYSICIAN ASSISTANT

## 2018-11-01 PROCEDURE — 93000 ELECTROCARDIOGRAM COMPLETE: CPT | Performed by: PHYSICIAN ASSISTANT

## 2018-11-01 NOTE — PROGRESS NOTES
Subjective   Ru Rico is a 38 y.o. female.   Chief Complaint   Patient presents with   • Dizziness   • Nausea   • Constipation       History of Present Illness     Ru is a 38-year-old female who presents with dizziness, vertigo,constipation, and nauseated for the past 2 weeks.  States the room spins occassionally. Ru has had some sinus pressure,sore throat,headaches dry to wet productive cough,shortness of air,abdominal pain and post nasal drip.  Denied any recent head injury/falls.  Ru has been drinking a lot of water.    She has been having constipation due to IBS.  She has been on Linzess but not on formulary.  Denied any dark black tarry stool.      She had a partial hysterectomy this past summer.  Ru states she has been on Racquel fast in the past for her chronic constipation.  States this has worked the best.  She has been unable to get a refill due to cost.    The following portions of the patient's history were reviewed and updated as appropriate: allergies, current medications, past family history, past medical history, past social history and past surgical history.    Review of Systems   Constitutional: Positive for fatigue. Negative for chills and fever.   HENT: Positive for postnasal drip, sinus pain, sinus pressure and sore throat. Negative for congestion, ear discharge and ear pain.    Eyes: Negative.    Respiratory: Positive for cough and shortness of breath. Negative for wheezing.    Cardiovascular: Negative.  Negative for chest pain, palpitations and leg swelling.   Gastrointestinal: Positive for abdominal pain, constipation and nausea. Negative for diarrhea and vomiting.   Endocrine: Negative.    Genitourinary: Negative.    Musculoskeletal: Negative.    Skin: Negative.    Allergic/Immunologic: Negative.    Neurological: Positive for dizziness, light-headedness and headaches.   Hematological: Negative.    Psychiatric/Behavioral: Negative.    All other systems reviewed and are  "negative.    Social History   Substance Use Topics   • Smoking status: Never Smoker   • Smokeless tobacco: Not on file   • Alcohol use Yes     Vitals:    11/01/18 1351 11/01/18 1433 11/01/18 1434 11/01/18 1435   BP: 120/64 100/64 104/72 108/70   BP Location: Left arm Right arm Right arm Right arm   Patient Position: Sitting Lying Sitting Standing   Cuff Size: Adult Adult Adult Adult   Pulse: 80 72 84 74   Resp: 16      Temp: 98.2 °F (36.8 °C)      TempSrc: Oral      SpO2: 99%      Weight: 95.7 kg (211 lb)      Height: 165.1 cm (65\")          Wt Readings from Last 3 Encounters:   11/01/18 95.7 kg (211 lb)   05/10/17 93 kg (205 lb)   05/03/17 93.4 kg (206 lb)       BP Readings from Last 3 Encounters:   11/01/18 108/70   05/10/17 120/68   05/03/17 112/80     Body mass index is 35.11 kg/m².  No Known Allergies    Objective   Physical Exam   Constitutional: She is oriented to person, place, and time. Vital signs are normal. She appears well-developed and well-nourished.   Looks fatigue   HENT:   Head: Normocephalic and atraumatic.   Right Ear: Hearing, tympanic membrane, external ear and ear canal normal.   Left Ear: Hearing, tympanic membrane, external ear and ear canal normal.   Nose: Nose normal. Right sinus exhibits no maxillary sinus tenderness and no frontal sinus tenderness. Left sinus exhibits no maxillary sinus tenderness and no frontal sinus tenderness.   Mouth/Throat: Uvula is midline and mucous membranes are normal. Posterior oropharyngeal erythema present.   Eyes: Pupils are equal, round, and reactive to light. Conjunctivae, EOM and lids are normal.   Neck: Trachea normal and phonation normal. Neck supple. Carotid bruit is not present. No edema present. No thyroid mass and no thyromegaly present.   Cardiovascular: Normal rate, regular rhythm, S1 normal, S2 normal, normal heart sounds and normal pulses.    Pulmonary/Chest: Effort normal and breath sounds normal.   Abdominal: Soft. Normal appearance, normal " aorta and bowel sounds are normal. There is no hepatomegaly. There is no tenderness.   Lymphadenopathy:     She has no cervical adenopathy.   Neurological: She is alert and oriented to person, place, and time.   Skin: Skin is warm, dry and intact. Capillary refill takes less than 2 seconds.   Psychiatric: She has a normal mood and affect. Her speech is normal and behavior is normal. Judgment and thought content normal. Cognition and memory are normal.         ECG 12 Lead  Date/Time: 11/1/2018 2:14 PM  Performed by: TORSTEN MARRERO  Authorized by: TORSTEN MARRERO   Comparison: not compared with previous ECG   Rhythm: sinus rhythm  Rate: normal  BPM: 77  Conduction: conduction normal  ST Segments: ST segments normal  T Waves: T waves normal  QRS axis: normal  Clinical impression: normal ECG  Comments: Indication: Shortness of air and dizziness  P/IN:  102/148 ms  QRS:  94 ms  QT/QTc:  390/441 ms              Results for orders placed or performed in visit on 11/01/18   POC Rapid Strep A   Result Value Ref Range    Rapid Strep A Screen Negative Negative, VALID, INVALID, Not Performed    Internal Control Passed Passed    Lot Number [id number]     Expiration Date 3,312,020        Assessment/Plan   Ru was seen today for dizziness, nausea and constipation.    Diagnoses and all orders for this visit:    Shortness of breath  -     ECG 12 Lead  -     CBC & Differential  -     Comprehensive Metabolic Panel    Chronic fatigue  -     Vitamin B12  -     CBC & Differential  -     Comprehensive Metabolic Panel  -     Thyroid Panel With TSH  -     Vitamin D 25 Hydroxy  -     Luci-Barr Virus VCA Antibody Panel    Dizziness  -     CBC & Differential  -     Comprehensive Metabolic Panel    Acute pharyngitis, unspecified etiology  -     POC Rapid Strep A  -     Luci-Barr Virus VCA Antibody Panel    Chronic constipation  -     Discontinue: linaclotide (LINZESS) 290 MCG capsule capsule; Take 1 capsule by mouth Every Morning  Before Breakfast.  -     linaclotide (LINZESS) 290 MCG capsule capsule; Take 1 capsule by mouth Every Morning Before Breakfast.      1. New Shortness of breath with dizziness: Ru had a normal sinus rhythm EKG at office visit today.  Orthostatic blood pressure readings were normal without change.  She will have a CBC, and CMP collected for further evaluation for possible anemia.  She will be notified of test results when completed.  2.  Chronic fatigue: In addition to above blood work she will have a vitamin B12, thyroid profile with TSH, vitamin D and an Luci-Manzano virus.  Shavon will be notified of test results when completed.  3.  New acute pharyngitis: In office strep screen was negative.  She has had a sore throat with chronic fatigue.  She will have an Luci-Barr virus collected to rule out mononucleosis.  4.  Chronic constipation: Ru has had  issues getting her Linzess refilled.  States this has helped with her chronic constipation.  I have Ru a written prescription for 90 days to take to her pharmacy with a savings voucher.  We'll reevaluate at next office visit.

## 2018-11-02 LAB
25(OH)D3+25(OH)D2 SERPL-MCNC: 56 NG/ML
ALBUMIN SERPL-MCNC: 4.3 G/DL (ref 3.5–5.2)
ALBUMIN/GLOB SERPL: 1.3 G/DL
ALP SERPL-CCNC: 81 U/L (ref 40–129)
ALT SERPL-CCNC: 14 U/L (ref 5–33)
AST SERPL-CCNC: 17 U/L (ref 5–32)
BASOPHILS # BLD AUTO: 0.05 10*3/MM3 (ref 0–0.2)
BASOPHILS NFR BLD AUTO: 0.4 % (ref 0–2)
BILIRUB SERPL-MCNC: 0.5 MG/DL (ref 0.2–1.2)
BUN SERPL-MCNC: 13 MG/DL (ref 6–20)
BUN/CREAT SERPL: 16.5 (ref 7–25)
CALCIUM SERPL-MCNC: 9.4 MG/DL (ref 8.6–10.5)
CHLORIDE SERPL-SCNC: 97 MMOL/L (ref 98–107)
CO2 SERPL-SCNC: 28.9 MMOL/L (ref 22–29)
CREAT SERPL-MCNC: 0.79 MG/DL (ref 0.57–1)
EBV EA IGG SER-ACNC: <9 U/ML (ref 0–8.9)
EBV NA IGG SER IA-ACNC: 258 U/ML (ref 0–17.9)
EBV VCA IGG SER IA-ACNC: 589 U/ML (ref 0–17.9)
EBV VCA IGM SER IA-ACNC: <36 U/ML (ref 0–35.9)
EOSINOPHIL # BLD AUTO: 0.17 10*3/MM3 (ref 0.1–0.3)
EOSINOPHIL NFR BLD AUTO: 1.5 % (ref 0–4)
ERYTHROCYTE [DISTWIDTH] IN BLOOD BY AUTOMATED COUNT: 13 % (ref 11.5–14.5)
FT4I SERPL CALC-MCNC: 2.1 (ref 1.2–4.9)
GLOBULIN SER CALC-MCNC: 3.4 GM/DL
GLUCOSE SERPL-MCNC: 88 MG/DL (ref 65–99)
HCT VFR BLD AUTO: 40 % (ref 37–47)
HGB BLD-MCNC: 13 G/DL (ref 12–16)
IMM GRANULOCYTES # BLD: 0.03 10*3/MM3 (ref 0–0.03)
IMM GRANULOCYTES NFR BLD: 0.3 % (ref 0–0.5)
LYMPHOCYTES # BLD AUTO: 2.75 10*3/MM3 (ref 0.6–4.8)
LYMPHOCYTES NFR BLD AUTO: 23.9 % (ref 20–45)
MCH RBC QN AUTO: 27.6 PG (ref 27–31)
MCHC RBC AUTO-ENTMCNC: 32.5 G/DL (ref 31–37)
MCV RBC AUTO: 84.9 FL (ref 81–99)
MONOCYTES # BLD AUTO: 0.92 10*3/MM3 (ref 0–1)
MONOCYTES NFR BLD AUTO: 8 % (ref 3–8)
NEUTROPHILS # BLD AUTO: 7.59 10*3/MM3 (ref 1.5–8.3)
NEUTROPHILS NFR BLD AUTO: 65.9 % (ref 45–70)
NRBC BLD AUTO-RTO: 0 /100 WBC (ref 0–0)
PLATELET # BLD AUTO: 342 10*3/MM3 (ref 140–500)
POTASSIUM SERPL-SCNC: 4 MMOL/L (ref 3.5–5.2)
PROT SERPL-MCNC: 7.7 G/DL (ref 6–8.5)
RBC # BLD AUTO: 4.71 10*6/MM3 (ref 4.2–5.4)
SERVICE CMNT-IMP: ABNORMAL
SODIUM SERPL-SCNC: 139 MMOL/L (ref 136–145)
T3RU NFR SERPL: 27 % (ref 24–39)
T4 SERPL-MCNC: 7.6 UG/DL (ref 4.5–12)
TSH SERPL DL<=0.005 MIU/L-ACNC: 0.84 UIU/ML (ref 0.45–4.5)
VIT B12 SERPL-MCNC: 989 PG/ML
WBC # BLD AUTO: 11.51 10*3/MM3 (ref 4.8–10.8)

## 2018-11-05 DIAGNOSIS — D72.829 LEUKOCYTOSIS, UNSPECIFIED TYPE: Primary | ICD-10-CM

## 2018-11-05 RX ORDER — AZITHROMYCIN 250 MG/1
TABLET, FILM COATED ORAL
Qty: 6 TABLET | Refills: 0 | Status: SHIPPED | OUTPATIENT
Start: 2018-11-05

## 2024-03-04 ENCOUNTER — HOSPITAL ENCOUNTER (OUTPATIENT)
Facility: HOSPITAL | Age: 44
Setting detail: OBSERVATION
Discharge: HOME | End: 2024-03-05
Attending: EMERGENCY MEDICINE | Admitting: EMERGENCY MEDICINE

## 2024-03-04 ENCOUNTER — APPOINTMENT (OUTPATIENT)
Dept: GENERAL RADIOLOGY | Facility: HOSPITAL | Age: 44
End: 2024-03-04

## 2024-03-04 DIAGNOSIS — R07.9 CHEST PAIN, UNSPECIFIED TYPE: Primary | ICD-10-CM

## 2024-03-04 LAB
ALBUMIN SERPL-MCNC: 4.5 G/DL (ref 3.5–5.2)
ALBUMIN/GLOB SERPL: 1 G/DL
ALP SERPL-CCNC: 130 U/L (ref 39–117)
ALT SERPL W P-5'-P-CCNC: 124 U/L (ref 1–33)
AMPHET+METHAMPHET UR QL: NEGATIVE
ANION GAP SERPL CALCULATED.3IONS-SCNC: 15 MMOL/L (ref 5–15)
AST SERPL-CCNC: 258 U/L (ref 1–32)
BARBITURATES UR QL SCN: NEGATIVE
BASOPHILS # BLD AUTO: 0.1 10*3/MM3 (ref 0–0.2)
BASOPHILS NFR BLD AUTO: 1.1 % (ref 0–1.5)
BENZODIAZ UR QL SCN: NEGATIVE
BILIRUB SERPL-MCNC: 0.8 MG/DL (ref 0–1.2)
BUN SERPL-MCNC: 12 MG/DL (ref 6–20)
BUN/CREAT SERPL: 16.2 (ref 7–25)
CALCIUM SPEC-SCNC: 9.9 MG/DL (ref 8.6–10.5)
CANNABINOIDS SERPL QL: NEGATIVE
CHLORIDE SERPL-SCNC: 96 MMOL/L (ref 98–107)
CHOLEST SERPL-MCNC: 255 MG/DL (ref 0–200)
CO2 SERPL-SCNC: 26 MMOL/L (ref 22–29)
COCAINE UR QL: NEGATIVE
CREAT SERPL-MCNC: 0.74 MG/DL (ref 0.57–1)
DEPRECATED RDW RBC AUTO: 46.4 FL (ref 37–54)
EGFRCR SERPLBLD CKD-EPI 2021: 102.5 ML/MIN/1.73
EOSINOPHIL # BLD AUTO: 0.2 10*3/MM3 (ref 0–0.4)
EOSINOPHIL NFR BLD AUTO: 2.1 % (ref 0.3–6.2)
ERYTHROCYTE [DISTWIDTH] IN BLOOD BY AUTOMATED COUNT: 13.2 % (ref 12.3–15.4)
GEN 5 2HR TROPONIN T REFLEX: <6 NG/L
GLOBULIN UR ELPH-MCNC: 4.4 GM/DL
GLUCOSE SERPL-MCNC: 106 MG/DL (ref 65–99)
HBA1C MFR BLD: 5.5 % (ref 4.8–5.6)
HCT VFR BLD AUTO: 42.6 % (ref 34–46.6)
HDLC SERPL-MCNC: 37 MG/DL (ref 40–60)
HGB BLD-MCNC: 14.7 G/DL (ref 12–15.9)
HOLD SPECIMEN: NORMAL
HOLD SPECIMEN: NORMAL
LDLC SERPL CALC-MCNC: 132 MG/DL (ref 0–100)
LDLC/HDLC SERPL: 3.34 {RATIO}
LIPASE SERPL-CCNC: 36 U/L (ref 13–60)
LYMPHOCYTES # BLD AUTO: 2.2 10*3/MM3 (ref 0.7–3.1)
LYMPHOCYTES NFR BLD AUTO: 22.5 % (ref 19.6–45.3)
MCH RBC QN AUTO: 32.5 PG (ref 26.6–33)
MCHC RBC AUTO-ENTMCNC: 34.4 G/DL (ref 31.5–35.7)
MCV RBC AUTO: 94.5 FL (ref 79–97)
METHADONE UR QL SCN: NEGATIVE
MONOCYTES # BLD AUTO: 0.6 10*3/MM3 (ref 0.1–0.9)
MONOCYTES NFR BLD AUTO: 6.6 % (ref 5–12)
NEUTROPHILS NFR BLD AUTO: 6.5 10*3/MM3 (ref 1.7–7)
NEUTROPHILS NFR BLD AUTO: 67.7 % (ref 42.7–76)
NRBC BLD AUTO-RTO: 0 /100 WBC (ref 0–0.2)
NT-PROBNP SERPL-MCNC: 246.9 PG/ML (ref 0–450)
OPIATES UR QL: NEGATIVE
OXYCODONE UR QL SCN: NEGATIVE
PLATELET # BLD AUTO: 270 10*3/MM3 (ref 140–450)
PMV BLD AUTO: 7.6 FL (ref 6–12)
POTASSIUM SERPL-SCNC: 3.2 MMOL/L (ref 3.5–5.2)
PROT SERPL-MCNC: 8.9 G/DL (ref 6–8.5)
RBC # BLD AUTO: 4.51 10*6/MM3 (ref 3.77–5.28)
SODIUM SERPL-SCNC: 137 MMOL/L (ref 136–145)
T4 FREE SERPL-MCNC: 1.49 NG/DL (ref 0.93–1.7)
TRIGL SERPL-MCNC: 472 MG/DL (ref 0–150)
TROPONIN T DELTA: NORMAL
TROPONIN T SERPL HS-MCNC: <6 NG/L
TSH SERPL DL<=0.05 MIU/L-ACNC: 1.76 UIU/ML (ref 0.27–4.2)
VLDLC SERPL-MCNC: 86 MG/DL (ref 5–40)
WBC NRBC COR # BLD AUTO: 9.6 10*3/MM3 (ref 3.4–10.8)
WHOLE BLOOD HOLD COAG: NORMAL
WHOLE BLOOD HOLD SPECIMEN: NORMAL

## 2024-03-04 PROCEDURE — 80307 DRUG TEST PRSMV CHEM ANLYZR: CPT | Performed by: NURSE PRACTITIONER

## 2024-03-04 PROCEDURE — 93005 ELECTROCARDIOGRAM TRACING: CPT

## 2024-03-04 PROCEDURE — 83036 HEMOGLOBIN GLYCOSYLATED A1C: CPT | Performed by: NURSE PRACTITIONER

## 2024-03-04 PROCEDURE — 84439 ASSAY OF FREE THYROXINE: CPT | Performed by: NURSE PRACTITIONER

## 2024-03-04 PROCEDURE — 25010000002 THIAMINE HCL 200 MG/2ML SOLUTION: Performed by: NURSE PRACTITIONER

## 2024-03-04 PROCEDURE — 96375 TX/PRO/DX INJ NEW DRUG ADDON: CPT

## 2024-03-04 PROCEDURE — 80053 COMPREHEN METABOLIC PANEL: CPT | Performed by: NURSE PRACTITIONER

## 2024-03-04 PROCEDURE — 96376 TX/PRO/DX INJ SAME DRUG ADON: CPT

## 2024-03-04 PROCEDURE — 25010000002 THIAMINE PER 100 MG: Performed by: NURSE PRACTITIONER

## 2024-03-04 PROCEDURE — 83690 ASSAY OF LIPASE: CPT | Performed by: NURSE PRACTITIONER

## 2024-03-04 PROCEDURE — 84484 ASSAY OF TROPONIN QUANT: CPT | Performed by: NURSE PRACTITIONER

## 2024-03-04 PROCEDURE — 85025 COMPLETE CBC W/AUTO DIFF WBC: CPT | Performed by: NURSE PRACTITIONER

## 2024-03-04 PROCEDURE — 83880 ASSAY OF NATRIURETIC PEPTIDE: CPT | Performed by: NURSE PRACTITIONER

## 2024-03-04 PROCEDURE — G0378 HOSPITAL OBSERVATION PER HR: HCPCS

## 2024-03-04 PROCEDURE — 96374 THER/PROPH/DIAG INJ IV PUSH: CPT

## 2024-03-04 PROCEDURE — 84443 ASSAY THYROID STIM HORMONE: CPT | Performed by: NURSE PRACTITIONER

## 2024-03-04 PROCEDURE — 25010000002 PROCHLORPERAZINE 10 MG/2ML SOLUTION: Performed by: NURSE PRACTITIONER

## 2024-03-04 PROCEDURE — 93005 ELECTROCARDIOGRAM TRACING: CPT | Performed by: EMERGENCY MEDICINE

## 2024-03-04 PROCEDURE — 80061 LIPID PANEL: CPT | Performed by: NURSE PRACTITIONER

## 2024-03-04 PROCEDURE — 71045 X-RAY EXAM CHEST 1 VIEW: CPT

## 2024-03-04 PROCEDURE — 99285 EMERGENCY DEPT VISIT HI MDM: CPT

## 2024-03-04 PROCEDURE — 25010000002 ONDANSETRON PER 1 MG: Performed by: NURSE PRACTITIONER

## 2024-03-04 RX ORDER — DIAZEPAM 5 MG/ML
5 INJECTION, SOLUTION INTRAMUSCULAR; INTRAVENOUS ONCE AS NEEDED
Status: DISCONTINUED | OUTPATIENT
Start: 2024-03-04 | End: 2024-03-05 | Stop reason: HOSPADM

## 2024-03-04 RX ORDER — PANTOPRAZOLE SODIUM 40 MG/1
40 TABLET, DELAYED RELEASE ORAL
Status: DISCONTINUED | OUTPATIENT
Start: 2024-03-05 | End: 2024-03-05 | Stop reason: HOSPADM

## 2024-03-04 RX ORDER — ASPIRIN 81 MG/1
324 TABLET, CHEWABLE ORAL ONCE
Status: COMPLETED | OUTPATIENT
Start: 2024-03-04 | End: 2024-03-04

## 2024-03-04 RX ORDER — SODIUM CHLORIDE 0.9 % (FLUSH) 0.9 %
10 SYRINGE (ML) INJECTION EVERY 12 HOURS SCHEDULED
Status: DISCONTINUED | OUTPATIENT
Start: 2024-03-04 | End: 2024-03-05 | Stop reason: HOSPADM

## 2024-03-04 RX ORDER — SODIUM CHLORIDE 0.9 % (FLUSH) 0.9 %
10 SYRINGE (ML) INJECTION AS NEEDED
Status: DISCONTINUED | OUTPATIENT
Start: 2024-03-04 | End: 2024-03-05 | Stop reason: HOSPADM

## 2024-03-04 RX ORDER — ONDANSETRON 4 MG/1
4 TABLET, ORALLY DISINTEGRATING ORAL EVERY 6 HOURS PRN
Status: DISCONTINUED | OUTPATIENT
Start: 2024-03-04 | End: 2024-03-05 | Stop reason: HOSPADM

## 2024-03-04 RX ORDER — LORAZEPAM 1 MG/1
1 TABLET ORAL EVERY 6 HOURS
Status: DISCONTINUED | OUTPATIENT
Start: 2024-03-05 | End: 2024-03-04

## 2024-03-04 RX ORDER — BISACODYL 5 MG/1
5 TABLET, DELAYED RELEASE ORAL DAILY PRN
Status: DISCONTINUED | OUTPATIENT
Start: 2024-03-04 | End: 2024-03-05 | Stop reason: HOSPADM

## 2024-03-04 RX ORDER — NITROGLYCERIN 0.4 MG/1
0.4 TABLET SUBLINGUAL
Status: COMPLETED | OUTPATIENT
Start: 2024-03-04 | End: 2024-03-04

## 2024-03-04 RX ORDER — AMOXICILLIN 250 MG
2 CAPSULE ORAL 2 TIMES DAILY PRN
Status: DISCONTINUED | OUTPATIENT
Start: 2024-03-04 | End: 2024-03-05 | Stop reason: HOSPADM

## 2024-03-04 RX ORDER — LORAZEPAM 1 MG/1
2 TABLET ORAL EVERY 6 HOURS
Status: COMPLETED | OUTPATIENT
Start: 2024-03-04 | End: 2024-03-05

## 2024-03-04 RX ORDER — NITROGLYCERIN 0.4 MG/1
0.4 TABLET SUBLINGUAL
Status: DISCONTINUED | OUTPATIENT
Start: 2024-03-04 | End: 2024-03-05 | Stop reason: HOSPADM

## 2024-03-04 RX ORDER — ACETAMINOPHEN 500 MG
1000 TABLET ORAL ONCE
Status: COMPLETED | OUTPATIENT
Start: 2024-03-04 | End: 2024-03-04

## 2024-03-04 RX ORDER — LORAZEPAM 1 MG/1
1 TABLET ORAL
Status: DISCONTINUED | OUTPATIENT
Start: 2024-03-04 | End: 2024-03-05 | Stop reason: HOSPADM

## 2024-03-04 RX ORDER — ACETAMINOPHEN 325 MG/1
650 TABLET ORAL EVERY 4 HOURS PRN
Status: DISCONTINUED | OUTPATIENT
Start: 2024-03-04 | End: 2024-03-05 | Stop reason: HOSPADM

## 2024-03-04 RX ORDER — THIAMINE HYDROCHLORIDE 100 MG/ML
200 INJECTION, SOLUTION INTRAMUSCULAR; INTRAVENOUS EVERY 8 HOURS SCHEDULED
Status: DISCONTINUED | OUTPATIENT
Start: 2024-03-04 | End: 2024-03-05 | Stop reason: HOSPADM

## 2024-03-04 RX ORDER — FOLIC ACID 1 MG/1
1 TABLET ORAL DAILY
Status: DISCONTINUED | OUTPATIENT
Start: 2024-03-04 | End: 2024-03-05 | Stop reason: HOSPADM

## 2024-03-04 RX ORDER — LOSARTAN POTASSIUM AND HYDROCHLOROTHIAZIDE 25; 100 MG/1; MG/1
0.5 TABLET ORAL DAILY
COMMUNITY

## 2024-03-04 RX ORDER — POTASSIUM CHLORIDE 20 MEQ/1
40 TABLET, EXTENDED RELEASE ORAL EVERY 4 HOURS
Qty: 4 TABLET | Refills: 0 | Status: COMPLETED | OUTPATIENT
Start: 2024-03-04 | End: 2024-03-04

## 2024-03-04 RX ORDER — LOSARTAN POTASSIUM 50 MG/1
100 TABLET ORAL
Status: DISCONTINUED | OUTPATIENT
Start: 2024-03-05 | End: 2024-03-05 | Stop reason: HOSPADM

## 2024-03-04 RX ORDER — OMEPRAZOLE 20 MG/1
20 CAPSULE, DELAYED RELEASE ORAL DAILY
COMMUNITY

## 2024-03-04 RX ORDER — HYDROCHLOROTHIAZIDE 25 MG/1
25 TABLET ORAL
Status: DISCONTINUED | OUTPATIENT
Start: 2024-03-05 | End: 2024-03-05 | Stop reason: HOSPADM

## 2024-03-04 RX ORDER — SODIUM CHLORIDE 9 MG/ML
40 INJECTION, SOLUTION INTRAVENOUS AS NEEDED
Status: DISCONTINUED | OUTPATIENT
Start: 2024-03-04 | End: 2024-03-05 | Stop reason: HOSPADM

## 2024-03-04 RX ORDER — MIDAZOLAM HYDROCHLORIDE 1 MG/ML
2 INJECTION INTRAMUSCULAR; INTRAVENOUS
Status: DISCONTINUED | OUTPATIENT
Start: 2024-03-04 | End: 2024-03-05 | Stop reason: HOSPADM

## 2024-03-04 RX ORDER — BISACODYL 10 MG
10 SUPPOSITORY, RECTAL RECTAL DAILY PRN
Status: DISCONTINUED | OUTPATIENT
Start: 2024-03-04 | End: 2024-03-05 | Stop reason: HOSPADM

## 2024-03-04 RX ORDER — PROCHLORPERAZINE EDISYLATE 5 MG/ML
5 INJECTION INTRAMUSCULAR; INTRAVENOUS ONCE
Status: COMPLETED | OUTPATIENT
Start: 2024-03-04 | End: 2024-03-04

## 2024-03-04 RX ORDER — ATORVASTATIN CALCIUM 40 MG/1
40 TABLET, FILM COATED ORAL NIGHTLY
Status: DISCONTINUED | OUTPATIENT
Start: 2024-03-04 | End: 2024-03-05 | Stop reason: HOSPADM

## 2024-03-04 RX ORDER — LORAZEPAM 1 MG/1
2 TABLET ORAL
Status: DISCONTINUED | OUTPATIENT
Start: 2024-03-04 | End: 2024-03-05 | Stop reason: HOSPADM

## 2024-03-04 RX ORDER — DIAZEPAM 5 MG/ML
5 INJECTION, SOLUTION INTRAMUSCULAR; INTRAVENOUS EVERY 4 HOURS PRN
Status: DISCONTINUED | OUTPATIENT
Start: 2024-03-04 | End: 2024-03-04

## 2024-03-04 RX ORDER — ONDANSETRON 2 MG/ML
4 INJECTION INTRAMUSCULAR; INTRAVENOUS EVERY 6 HOURS PRN
Status: DISCONTINUED | OUTPATIENT
Start: 2024-03-04 | End: 2024-03-05 | Stop reason: HOSPADM

## 2024-03-04 RX ORDER — POLYETHYLENE GLYCOL 3350 17 G/17G
17 POWDER, FOR SOLUTION ORAL DAILY PRN
Status: DISCONTINUED | OUTPATIENT
Start: 2024-03-04 | End: 2024-03-05 | Stop reason: HOSPADM

## 2024-03-04 RX ADMIN — NITROGLYCERIN 0.4 MG: 0.4 TABLET SUBLINGUAL at 11:53

## 2024-03-04 RX ADMIN — ATORVASTATIN CALCIUM 40 MG: 40 TABLET, FILM COATED ORAL at 20:11

## 2024-03-04 RX ADMIN — Medication 10 ML: at 16:24

## 2024-03-04 RX ADMIN — POTASSIUM CHLORIDE 40 MEQ: 1500 TABLET, EXTENDED RELEASE ORAL at 16:24

## 2024-03-04 RX ADMIN — THIAMINE HYDROCHLORIDE 200 MG: 100 INJECTION, SOLUTION INTRAMUSCULAR; INTRAVENOUS at 16:24

## 2024-03-04 RX ADMIN — Medication 10 ML: at 20:14

## 2024-03-04 RX ADMIN — NITROGLYCERIN 0.4 MG: 0.4 TABLET SUBLINGUAL at 11:48

## 2024-03-04 RX ADMIN — ONDANSETRON 4 MG: 2 INJECTION INTRAMUSCULAR; INTRAVENOUS at 20:14

## 2024-03-04 RX ADMIN — LORAZEPAM 2 MG: 1 TABLET ORAL at 16:24

## 2024-03-04 RX ADMIN — THIAMINE HYDROCHLORIDE 200 MG: 100 INJECTION, SOLUTION INTRAMUSCULAR; INTRAVENOUS at 22:56

## 2024-03-04 RX ADMIN — NITROGLYCERIN 0.4 MG: 0.4 TABLET SUBLINGUAL at 11:58

## 2024-03-04 RX ADMIN — POTASSIUM CHLORIDE 40 MEQ: 1500 TABLET, EXTENDED RELEASE ORAL at 20:11

## 2024-03-04 RX ADMIN — ACETAMINOPHEN 1000 MG: 500 TABLET, FILM COATED ORAL at 13:12

## 2024-03-04 RX ADMIN — LORAZEPAM 2 MG: 1 TABLET ORAL at 20:11

## 2024-03-04 RX ADMIN — ASPIRIN 81 MG CHEWABLE TABLET 324 MG: 81 TABLET CHEWABLE at 11:48

## 2024-03-04 RX ADMIN — PROCHLORPERAZINE EDISYLATE 5 MG: 5 INJECTION INTRAMUSCULAR; INTRAVENOUS at 14:54

## 2024-03-04 RX ADMIN — FOLIC ACID 1 MG: 1 TABLET ORAL at 16:24

## 2024-03-04 NOTE — ED NOTES
Patient does not want the valium at the moment. RN and patient discussed withdrawal sx and whenever that she is ready or feels like she needs any medication to help with any anxiousness, restlessness, shaking, or irritability to let us know.

## 2024-03-04 NOTE — ED PROVIDER NOTES
"Subjective   History of Present Illness  43-year-old  female presents to the emergency room with complaint of sudden onset of \"chest flutters\", chest heaviness shortness of breath and dizziness.  She states that she was out running errands when this happened about an hour ago.  Patient states that she takes omeprazole and some vitamins and then is also prescribed losartan/HCTZ.  Patient states that she has never had the symptoms before.  Patient reports that her gallbladder was removed in 2016.      Review of Systems   Respiratory:  Positive for chest tightness and shortness of breath.    Cardiovascular:  Positive for chest pain and palpitations.   Gastrointestinal:  Positive for diarrhea.   Neurological:  Positive for dizziness.       Past Medical History:   Diagnosis Date    Anxiety     Asthma     Depression     GERD (gastroesophageal reflux disease)     Headache     Headache, tension-type     Menstrual problem     Migraine        No Known Allergies    Past Surgical History:   Procedure Laterality Date    CHOLECYSTECTOMY      CYSTOSCOPY W/ URETERAL STENT PLACEMENT Right 12/2/2016    Procedure: CYSTOSCOPY RIGHT  URETERAL CATHETER/STENT INSERTION BASKET EXTRACTION, LASER LITHOTRIPSY  ;  Surgeon: Christophe Doe MD;  Location: [facility];  Service:     LAPAROSCOPIC TOTAL HYSTERECTOMY  08/2017       Family History   Problem Relation Age of Onset    Heart disease Mother     Hypertension Mother     Depression Mother     Anxiety disorder Mother     Bipolar disorder Brother     Heart disease Maternal Grandmother     Hypertension Maternal Grandmother     Cancer Maternal Grandmother     Thyroid disease Maternal Grandmother     Lung disease Maternal Grandmother     Depression Maternal Grandmother     Glaucoma Maternal Grandmother     Anxiety disorder Maternal Grandmother     Macular degeneration Maternal Grandmother        Social History     Socioeconomic History    Marital status:    Tobacco Use    " Smoking status: Never   Substance and Sexual Activity    Alcohol use: Yes    Drug use: No           Objective   Physical Exam  Constitutional:       General: She is in acute distress.      Appearance: She is diaphoretic. She is not ill-appearing or toxic-appearing.   HENT:      Head: Normocephalic and atraumatic.      Mouth/Throat:      Mouth: Mucous membranes are moist.      Pharynx: Oropharynx is clear.   Eyes:      Extraocular Movements: Extraocular movements intact.      Conjunctiva/sclera: Conjunctivae normal.      Pupils: Pupils are equal, round, and reactive to light.   Cardiovascular:      Rate and Rhythm: Normal rate.      Pulses: Normal pulses.   Pulmonary:      Effort: Pulmonary effort is normal.   Musculoskeletal:         General: Normal range of motion.      Cervical back: Normal range of motion and neck supple.   Skin:     General: Skin is warm and dry.      Capillary Refill: Capillary refill takes less than 2 seconds.   Neurological:      General: No focal deficit present.      Mental Status: She is alert and oriented to person, place, and time.   Psychiatric:         Attention and Perception: Attention and perception normal.         Mood and Affect: Mood is anxious. Affect is tearful.         Speech: Speech normal.         Behavior: Behavior normal. Behavior is not aggressive. Behavior is cooperative.         Thought Content: Thought content normal.         Cognition and Memory: Cognition and memory normal.         Judgment: Judgment normal.         Procedures           ED Course  ED Course as of 03/04/24 1318   Mon Mar 04, 2024   1310 Discussed patient case with MALINDA Cowan in ED OBS and agrees to take over care. [CT]      ED Course User Index  [CT] Carmen Barraza APRN      Labs Reviewed   COMPREHENSIVE METABOLIC PANEL - Abnormal; Notable for the following components:       Result Value    Glucose 106 (*)     Potassium 3.2 (*)     Chloride 96 (*)     Total Protein 8.9 (*)     ALT (SGPT) 124 (*)      AST (SGOT) 258 (*)     Alkaline Phosphatase 130 (*)     All other components within normal limits    Narrative:     GFR Normal >60  Chronic Kidney Disease <60  Kidney Failure <15     BNP (IN-HOUSE) - Normal    Narrative:     This assay is used as an aid in the diagnosis of individuals suspected of having heart failure. It can be used as an aid in the diagnosis of acute decompensated heart failure (ADHF) in patients presenting with signs and symptoms of ADHF to the emergency department (ED). In addition, NT-proBNP of <300 pg/mL indicates ADHF is not likely.    Age Range Result Interpretation  NT-proBNP Concentration (pg/mL:      <50             Positive            >450                   Gray                 300-450                    Negative             <300    50-75           Positive            >900                  Gray                300-900                  Negative            <300      >75             Positive            >1800                  Gray                300-1800                  Negative            <300   TROPONIN - Normal    Narrative:     High Sensitive Troponin T Reference Range:  <14.0 ng/L- Negative Female for AMI  <22.0 ng/L- Negative Male for AMI  >=14 - Abnormal Female indicating possible myocardial injury.  >=22 - Abnormal Male indicating possible myocardial injury.   Clinicians would have to utilize clinical acumen, EKG, Troponin, and serial changes to determine if it is an Acute Myocardial Infarction or myocardial injury due to an underlying chronic condition.        CBC WITH AUTO DIFFERENTIAL - Normal   RAINBOW DRAW    Narrative:     The following orders were created for panel order [place] Draw.  Procedure                               Abnormality         Status                     ---------                               -----------         ------                     Green Top (Gel)[[id number]]                                  Final result               Lavender Top[[id number]]           "                           Final result               Gold Top - SST[[id number]]                                   Final result               Light Blue Top[[id number]]                                   Final result                 Please view results for these tests on the individual orders.   HIGH SENSITIVITIY TROPONIN T 2HR   GREEN TOP   LAVENDER TOP   GOLD TOP - SST   LIGHT BLUE TOP   CBC AND DIFFERENTIAL    Narrative:     The following orders were created for panel order CBC & Differential.  Procedure                               Abnormality         Status                     ---------                               -----------         ------                     CBC Auto Differential[[id number]]        Normal              Final result                 Please view results for these tests on the individual orders.              HEART Score: 2                    Medications   sodium chloride 0.9 % flush 10 mL (has no administration in time range)   sodium chloride 0.9 % flush 10 mL (has no administration in time range)   prochlorperazine (COMPAZINE) injection 5 mg (has no administration in time range)   diazePAM (VALIUM) injection 5 mg (has no administration in time range)   aspirin chewable tablet 324 mg (324 mg Oral Given 3/4/24 1148)   nitroglycerin (NITROSTAT) SL tablet 0.4 mg (0.4 mg Sublingual Given 3/4/24 1158)   acetaminophen (TYLENOL) tablet 1,000 mg (1,000 mg Oral Given 3/4/24 1312)            XR Chest 1 View    Result Date: 3/4/2024  Impression: No acute process. Electronically Signed: Anh Kraft MD  3/4/2024 11:54 AM EST  Workstation ID: [id number]    Medical Decision Making  43-year-old female presents to the emergency room with complaint of chest pain and \"chest flutters\".    Problems Addressed:  Chest pain, unspecified type: acute illness or injury     Details: Started 1 hour prior to arrival to the ER.  324 mg of aspirin and nitro sublingual was ordered and administered while in ED.    Amount and/or " Complexity of Data Reviewed  Labs: ordered.     Details: CBC and x 2 troponins are negative for any acute abnormality.  CMP is remarkable for very mildly decreased potassium but more significant is markedly elevated ALT AST and alk phos.  Patient states that she had her gallbladder removed in 2016.  Patient also states that she is an every day alcohol drinker.  Radiology: ordered.     Details: Chest x-ray is negative for acute    Risk  OTC drugs.  Prescription drug management.  Decision regarding hospitalization.  Risk Details: Placed in ED OBS for chest pain r/o and further eval and care of elevated liver enzymes and alk phos concerning for bile duct abnormality vs ETOH etiology.      Vitals:    03/04/24 1153 03/04/24 1157 03/04/24 1216 03/04/24 1301   BP: 134/92 131/86 133/89 141/91   BP Location:       Patient Position:       Pulse: 68 69 64 63   Resp:       Temp:       TempSrc:       SpO2: 96% 96% 94% 91%   Weight:       Height:          Final diagnoses:   Chest pain, unspecified type       ED Disposition  ED Disposition       ED Disposition   Decision to Admit    Condition   --    Comment   --               No follow-up provider specified.       Medication List      No changes were made to your prescriptions during this visit.            Carmen Barraza, APRN  03/04/24 3796

## 2024-03-05 ENCOUNTER — APPOINTMENT (OUTPATIENT)
Dept: NUCLEAR MEDICINE | Facility: HOSPITAL | Age: 44
End: 2024-03-05

## 2024-03-05 VITALS
HEIGHT: 67 IN | BODY MASS INDEX: 31.39 KG/M2 | RESPIRATION RATE: 16 BRPM | HEART RATE: 72 BPM | SYSTOLIC BLOOD PRESSURE: 156 MMHG | WEIGHT: 200 LBS | OXYGEN SATURATION: 98 % | DIASTOLIC BLOOD PRESSURE: 89 MMHG | TEMPERATURE: 97.5 F

## 2024-03-05 LAB
ALBUMIN SERPL-MCNC: 3.8 G/DL (ref 3.5–5.2)
ALP SERPL-CCNC: 103 U/L (ref 39–117)
ALT SERPL W P-5'-P-CCNC: 89 U/L (ref 1–33)
ANION GAP SERPL CALCULATED.3IONS-SCNC: 10 MMOL/L (ref 5–15)
AST SERPL-CCNC: 157 U/L (ref 1–32)
BASOPHILS # BLD AUTO: 0.1 10*3/MM3 (ref 0–0.2)
BASOPHILS NFR BLD AUTO: 1 % (ref 0–1.5)
BH CV REST NUCLEAR ISOTOPE DOSE: 11 MCI
BH CV STRESS BP STAGE 1: NORMAL
BH CV STRESS BP STAGE 2: NORMAL
BH CV STRESS COMMENTS STAGE 1: NORMAL
BH CV STRESS COMMENTS STAGE 2: NORMAL
BH CV STRESS DOSE REGADENOSON STAGE 1: 0.4
BH CV STRESS DURATION MIN STAGE 1: 0
BH CV STRESS DURATION MIN STAGE 2: 4
BH CV STRESS DURATION SEC STAGE 1: 10
BH CV STRESS DURATION SEC STAGE 2: 0
BH CV STRESS HR STAGE 1: 73
BH CV STRESS HR STAGE 2: 105
BH CV STRESS NUCLEAR ISOTOPE DOSE: 30 MCI
BH CV STRESS PROTOCOL 1: NORMAL
BH CV STRESS RECOVERY BP: NORMAL MMHG
BH CV STRESS RECOVERY HR: 90 BPM
BH CV STRESS STAGE 1: 1
BH CV STRESS STAGE 2: 2
BILIRUB CONJ SERPL-MCNC: 0.3 MG/DL (ref 0–0.3)
BILIRUB INDIRECT SERPL-MCNC: 0.7 MG/DL
BILIRUB SERPL-MCNC: 1 MG/DL (ref 0–1.2)
BUN SERPL-MCNC: 12 MG/DL (ref 6–20)
BUN/CREAT SERPL: 18.8 (ref 7–25)
CALCIUM SPEC-SCNC: 9 MG/DL (ref 8.6–10.5)
CHLORIDE SERPL-SCNC: 100 MMOL/L (ref 98–107)
CO2 SERPL-SCNC: 27 MMOL/L (ref 22–29)
CREAT SERPL-MCNC: 0.64 MG/DL (ref 0.57–1)
DEPRECATED RDW RBC AUTO: 45.9 FL (ref 37–54)
EGFRCR SERPLBLD CKD-EPI 2021: 111.9 ML/MIN/1.73
EOSINOPHIL # BLD AUTO: 0.3 10*3/MM3 (ref 0–0.4)
EOSINOPHIL NFR BLD AUTO: 4.4 % (ref 0.3–6.2)
ERYTHROCYTE [DISTWIDTH] IN BLOOD BY AUTOMATED COUNT: 13.3 % (ref 12.3–15.4)
ETHANOL UR-MCNC: NEGATIVE %
GLUCOSE SERPL-MCNC: 109 MG/DL (ref 65–99)
HCT VFR BLD AUTO: 38.7 % (ref 34–46.6)
HGB BLD-MCNC: 13.4 G/DL (ref 12–15.9)
LV EF NUC BP: 73 %
LYMPHOCYTES # BLD AUTO: 1.4 10*3/MM3 (ref 0.7–3.1)
LYMPHOCYTES NFR BLD AUTO: 20.3 % (ref 19.6–45.3)
MAXIMAL PREDICTED HEART RATE: 176 BPM
MCH RBC QN AUTO: 32.5 PG (ref 26.6–33)
MCHC RBC AUTO-ENTMCNC: 34.5 G/DL (ref 31.5–35.7)
MCV RBC AUTO: 94.3 FL (ref 79–97)
MONOCYTES # BLD AUTO: 0.5 10*3/MM3 (ref 0.1–0.9)
MONOCYTES NFR BLD AUTO: 6.8 % (ref 5–12)
NEUTROPHILS NFR BLD AUTO: 4.7 10*3/MM3 (ref 1.7–7)
NEUTROPHILS NFR BLD AUTO: 67.5 % (ref 42.7–76)
NRBC BLD AUTO-RTO: 0 /100 WBC (ref 0–0.2)
PERCENT MAX PREDICTED HR: 59.66 %
PLATELET # BLD AUTO: 222 10*3/MM3 (ref 140–450)
PMV BLD AUTO: 8 FL (ref 6–12)
POTASSIUM SERPL-SCNC: 3.7 MMOL/L (ref 3.5–5.2)
POTASSIUM SERPL-SCNC: 3.7 MMOL/L (ref 3.5–5.2)
PROT SERPL-MCNC: 7.4 G/DL (ref 6–8.5)
QT INTERVAL: 401 MS
QTC INTERVAL: 470 MS
RBC # BLD AUTO: 4.11 10*6/MM3 (ref 3.77–5.28)
SODIUM SERPL-SCNC: 137 MMOL/L (ref 136–145)
STRESS BASELINE BP: NORMAL MMHG
STRESS BASELINE HR: 73 BPM
STRESS PERCENT HR: 70 %
STRESS POST PEAK BP: NORMAL MMHG
STRESS POST PEAK HR: 105 BPM
STRESS TARGET HR: 150 BPM
WBC NRBC COR # BLD AUTO: 6.9 10*3/MM3 (ref 3.4–10.8)

## 2024-03-05 PROCEDURE — 78452 HT MUSCLE IMAGE SPECT MULT: CPT

## 2024-03-05 PROCEDURE — 78452 HT MUSCLE IMAGE SPECT MULT: CPT | Performed by: INTERNAL MEDICINE

## 2024-03-05 PROCEDURE — 85025 COMPLETE CBC W/AUTO DIFF WBC: CPT | Performed by: NURSE PRACTITIONER

## 2024-03-05 PROCEDURE — 25010000002 THIAMINE HCL 200 MG/2ML SOLUTION: Performed by: NURSE PRACTITIONER

## 2024-03-05 PROCEDURE — 93017 CV STRESS TEST TRACING ONLY: CPT

## 2024-03-05 PROCEDURE — G0378 HOSPITAL OBSERVATION PER HR: HCPCS

## 2024-03-05 PROCEDURE — 25010000002 REGADENOSON 0.4 MG/5ML SOLUTION: Performed by: EMERGENCY MEDICINE

## 2024-03-05 PROCEDURE — 84132 ASSAY OF SERUM POTASSIUM: CPT | Performed by: NURSE PRACTITIONER

## 2024-03-05 PROCEDURE — 93018 CV STRESS TEST I&R ONLY: CPT | Performed by: INTERNAL MEDICINE

## 2024-03-05 PROCEDURE — 80048 BASIC METABOLIC PNL TOTAL CA: CPT | Performed by: NURSE PRACTITIONER

## 2024-03-05 PROCEDURE — 96376 TX/PRO/DX INJ SAME DRUG ADON: CPT

## 2024-03-05 PROCEDURE — 0 TECHNETIUM TETROFOSMIN KIT: Performed by: EMERGENCY MEDICINE

## 2024-03-05 PROCEDURE — A9502 TC99M TETROFOSMIN: HCPCS | Performed by: EMERGENCY MEDICINE

## 2024-03-05 PROCEDURE — 80076 HEPATIC FUNCTION PANEL: CPT | Performed by: NURSE PRACTITIONER

## 2024-03-05 RX ORDER — ATORVASTATIN CALCIUM 40 MG/1
40 TABLET, FILM COATED ORAL NIGHTLY
Qty: 90 TABLET | Refills: 0 | Status: SHIPPED | OUTPATIENT
Start: 2024-03-05

## 2024-03-05 RX ORDER — HYDROXYZINE HYDROCHLORIDE 25 MG/1
25 TABLET, FILM COATED ORAL 3 TIMES DAILY PRN
Qty: 30 TABLET | Refills: 0 | Status: SHIPPED | OUTPATIENT
Start: 2024-03-05

## 2024-03-05 RX ORDER — FOLIC ACID 1 MG/1
1 TABLET ORAL DAILY
Qty: 90 TABLET | Refills: 0 | Status: SHIPPED | OUTPATIENT
Start: 2024-03-06

## 2024-03-05 RX ORDER — REGADENOSON 0.08 MG/ML
0.4 INJECTION, SOLUTION INTRAVENOUS
Status: COMPLETED | OUTPATIENT
Start: 2024-03-05 | End: 2024-03-05

## 2024-03-05 RX ORDER — AMLODIPINE BESYLATE 5 MG/1
5 TABLET ORAL
Status: DISCONTINUED | OUTPATIENT
Start: 2024-03-05 | End: 2024-03-05 | Stop reason: HOSPADM

## 2024-03-05 RX ORDER — LANOLIN ALCOHOL/MO/W.PET/CERES
100 CREAM (GRAM) TOPICAL DAILY
Qty: 90 TABLET | Refills: 0 | Status: SHIPPED | OUTPATIENT
Start: 2024-03-09

## 2024-03-05 RX ORDER — AMLODIPINE BESYLATE 5 MG/1
5 TABLET ORAL
Qty: 90 TABLET | Refills: 0 | Status: SHIPPED | OUTPATIENT
Start: 2024-03-05

## 2024-03-05 RX ADMIN — PANTOPRAZOLE SODIUM 40 MG: 40 TABLET, DELAYED RELEASE ORAL at 06:09

## 2024-03-05 RX ADMIN — THIAMINE HYDROCHLORIDE 200 MG: 100 INJECTION, SOLUTION INTRAMUSCULAR; INTRAVENOUS at 06:09

## 2024-03-05 RX ADMIN — LORAZEPAM 2 MG: 1 TABLET ORAL at 09:11

## 2024-03-05 RX ADMIN — TETROFOSMIN 1 DOSE: 1.38 INJECTION, POWDER, LYOPHILIZED, FOR SOLUTION INTRAVENOUS at 06:52

## 2024-03-05 RX ADMIN — FOLIC ACID 1 MG: 1 TABLET ORAL at 09:11

## 2024-03-05 RX ADMIN — LOSARTAN POTASSIUM 100 MG: 50 TABLET, FILM COATED ORAL at 09:11

## 2024-03-05 RX ADMIN — TETROFOSMIN 1 DOSE: 1.38 INJECTION, POWDER, LYOPHILIZED, FOR SOLUTION INTRAVENOUS at 08:16

## 2024-03-05 RX ADMIN — HYDROCHLOROTHIAZIDE 25 MG: 25 TABLET ORAL at 09:11

## 2024-03-05 RX ADMIN — LORAZEPAM 2 MG: 1 TABLET ORAL at 02:22

## 2024-03-05 RX ADMIN — REGADENOSON 0.4 MG: 0.08 INJECTION, SOLUTION INTRAVENOUS at 08:16

## 2024-03-05 RX ADMIN — Medication 10 ML: at 09:12

## 2024-03-05 NOTE — DISCHARGE SUMMARY
"[facility]    Provider, No Known    CHIEF COMPLAINT:     Chest Pain     HISTORY OF PRESENT ILLNESS:    HPI    43-year-old  female presents to the emergency room with complaint of sudden onset of \"chest flutters\", chest heaviness shortness of breath and dizziness. She states that she was out running errands when this happened about an hour ago. Patient states that she takes omeprazole and some vitamins and then is also prescribed losartan/HCTZ. Patient states that she has never had the symptoms before. Patient reports that her gallbladder was removed in 2016.     Past Medical History:   Diagnosis Date    Anxiety     Asthma     Depression     GERD (gastroesophageal reflux disease)     Headache     Headache, tension-type     Menstrual problem     Migraine      Past Surgical History:   Procedure Laterality Date    CHOLECYSTECTOMY      CYSTOSCOPY W/ URETERAL STENT PLACEMENT Right 12/2/2016    Procedure: CYSTOSCOPY RIGHT  URETERAL CATHETER/STENT INSERTION BASKET EXTRACTION, LASER LITHOTRIPSY  ;  Surgeon: Christophe Doe MD;  Location: [facility];  Service:     LAPAROSCOPIC TOTAL HYSTERECTOMY  08/2017     Family History   Problem Relation Age of Onset    Heart disease Mother     Hypertension Mother     Depression Mother     Anxiety disorder Mother     Bipolar disorder Brother     Heart disease Maternal Grandmother     Hypertension Maternal Grandmother     Cancer Maternal Grandmother     Thyroid disease Maternal Grandmother     Lung disease Maternal Grandmother     Depression Maternal Grandmother     Glaucoma Maternal Grandmother     Anxiety disorder Maternal Grandmother     Macular degeneration Maternal Grandmother      Social History     Tobacco Use    Smoking status: Never   Vaping Use    Vaping status: Never Used   Substance Use Topics    Alcohol use: Yes    Drug use: No     Medications Prior to Admission   Medication Sig Dispense Refill Last Dose    losartan-hydrochlorothiazide " (HYZAAR) 100-25 MG per tablet Take 0.5 tablets by mouth Daily.   3/4/2024    omeprazole (priLOSEC) 20 MG capsule Take 1 capsule by mouth Daily.   3/4/2024     Allergies:  Patient has no known allergies.    Immunization History   Administered Date(s) Administered    Influenza TIV (IM) 02/05/2016           REVIEW OF SYSTEMS:    Review of Systems   Constitutional: Positive for malaise/fatigue.   HENT: Negative.     Eyes: Negative.    Cardiovascular:  Positive for dyspnea on exertion and palpitations.   Respiratory:  Positive for shortness of breath.    Endocrine: Negative.    Hematologic/Lymphatic: Negative.    Skin: Negative.    Musculoskeletal: Negative.    Gastrointestinal:  Positive for nausea.   Genitourinary: Negative.    Psychiatric/Behavioral:  The patient is nervous/anxious.    Allergic/Immunologic: Negative.        Vital Signs  Temp:  [97.5 °F (36.4 °C)-97.9 °F (36.6 °C)] 97.5 °F (36.4 °C)  Heart Rate:  [62-81] 72  Resp:  [14-20] 16  BP: ()/() 156/89          Physical Exam:  Physical Exam  Vitals and nursing note reviewed.   Constitutional:       Appearance: Normal appearance.   HENT:      Head: Normocephalic and atraumatic.      Right Ear: External ear normal.      Left Ear: External ear normal.      Nose: Nose normal.      Mouth/Throat:      Pharynx: Oropharynx is clear.   Eyes:      Extraocular Movements: Extraocular movements intact.      Conjunctiva/sclera: Conjunctivae normal.      Pupils: Pupils are equal, round, and reactive to light.   Cardiovascular:      Rate and Rhythm: Normal rate and regular rhythm.      Pulses: Normal pulses.      Heart sounds: Normal heart sounds.   Pulmonary:      Effort: Pulmonary effort is normal.      Breath sounds: Normal breath sounds.   Abdominal:      General: Bowel sounds are normal.      Palpations: Abdomen is soft.   Musculoskeletal:         General: Normal range of motion.      Cervical back: Normal range of motion.   Skin:     General: Skin is warm.       Capillary Refill: Capillary refill takes less than 2 seconds.   Neurological:      Mental Status: She is alert and oriented to person, place, and time.   Psychiatric:         Mood and Affect: Mood is anxious.         Behavior: Behavior normal.         Thought Content: Thought content normal.         Judgment: Judgment normal.         Emotional Behavior:    WNl   Debilities:   none  Results Review:    I reviewed the patient's new clinical results.  Lab Results (most recent)       Procedure Component Value Units Date/Time    Hepatic Function Panel [[id number]]  (Abnormal) Collected: 03/05/24 0337    Specimen: Blood from Arm, Left Updated: 03/05/24 0756     Total Protein 7.4 g/dL      Albumin 3.8 g/dL      ALT (SGPT) 89 U/L      AST (SGOT) 157 U/L      Alkaline Phosphatase 103 U/L      Total Bilirubin 1.0 mg/dL      Bilirubin, Direct 0.3 mg/dL      Bilirubin, Indirect 0.7 mg/dL     Potassium [[id number]]  (Normal) Collected: 03/05/24 0337    Specimen: Blood from Arm, Left Updated: 03/05/24 0500     Potassium 3.7 mmol/L     Basic Metabolic Panel [[id number]]  (Abnormal) Collected: 03/05/24 0337    Specimen: Blood from Arm, Left Updated: 03/05/24 0500     Glucose 109 mg/dL      BUN 12 mg/dL      Creatinine 0.64 mg/dL      Sodium 137 mmol/L      Potassium 3.7 mmol/L      Chloride 100 mmol/L      CO2 27.0 mmol/L      Calcium 9.0 mg/dL      BUN/Creatinine Ratio 18.8     Anion Gap 10.0 mmol/L      eGFR 111.9 mL/min/1.73     Narrative:      GFR Normal >60  Chronic Kidney Disease <60  Kidney Failure <15      CBC & Differential [[id number]]  (Normal) Collected: 03/05/24 0337    Specimen: Blood from Arm, Left Updated: 03/05/24 0436    Narrative:      The following orders were created for panel order CBC & Differential.  Procedure                               Abnormality         Status                     ---------                               -----------         ------                     CBC Auto Differential[[id number]]         Normal              Final result                 Please view results for these tests on the individual orders.    CBC Auto Differential [[id number]]  (Normal) Collected: 03/05/24 0337    Specimen: Blood from Arm, Left Updated: 03/05/24 0436     WBC 6.90 10*3/mm3      RBC 4.11 10*6/mm3      Hemoglobin 13.4 g/dL      Hematocrit 38.7 %      MCV 94.3 fL      MCH 32.5 pg      MCHC 34.5 g/dL      RDW 13.3 %      RDW-SD 45.9 fl      MPV 8.0 fL      Platelets 222 10*3/mm3      Neutrophil % 67.5 %      Lymphocyte % 20.3 %      Monocyte % 6.8 %      Eosinophil % 4.4 %      Basophil % 1.0 %      Neutrophils, Absolute 4.70 10*3/mm3      Lymphocytes, Absolute 1.40 10*3/mm3      Monocytes, Absolute 0.50 10*3/mm3      Eosinophils, Absolute 0.30 10*3/mm3      Basophils, Absolute 0.10 10*3/mm3      nRBC 0.0 /100 WBC     Urine Drug Screen - Urine, Clean Catch [[id number]]  (Normal) Collected: 03/04/24 1454    Specimen: Urine, Clean Catch Updated: 03/04/24 1534     Amphet/Methamphet, Screen Negative     Barbiturates Screen, Urine Negative     Benzodiazepine Screen, Urine Negative     Cocaine Screen, Urine Negative     Opiate Screen Negative     THC, Screen, Urine Negative     Methadone Screen, Urine Negative     Oxycodone Screen, Urine Negative    Narrative:      Negative Thresholds Per Drugs Screened:    Amphetamines                 500 ng/ml  Barbiturates                 200 ng/ml  Benzodiazepines              100 ng/ml  Cocaine                      300 ng/ml  Methadone                    300 ng/ml  Opiates                      300 ng/ml  Oxycodone                    100 ng/ml  THC                           50 ng/ml    The Normal Value for all drugs tested is negative. This report includes final unconfirmed screening results to be used for medical treatment purposes only. Unconfirmed results must not be used for non-medical purposes such as employment or legal testing. Clinical consideration should be applied to any drug of abuse  test, particularly when unconfirmed results are used.          All urine drugs of abuse requests without chain of custody are for medical screening purposes only.  False positives are possible.      High Sensitivity Troponin T 2Hr [[id number]] Collected: 03/04/24 1457    Specimen: Blood Updated: 03/04/24 1530     HS Troponin T <6 ng/L      Troponin T Delta --     Comment: Unable to calculate.       Narrative:      High Sensitive Troponin T Reference Range:  <14.0 ng/L- Negative Female for AMI  <22.0 ng/L- Negative Male for AMI  >=14 - Abnormal Female indicating possible myocardial injury.  >=22 - Abnormal Male indicating possible myocardial injury.   Clinicians would have to utilize clinical acumen, EKG, Troponin, and serial changes to determine if it is an Acute Myocardial Infarction or myocardial injury due to an underlying chronic condition.         Ethanol, Urine - Urine, Clean Catch [[id number]] Collected: 03/04/24 1454    Specimen: Urine, Clean Catch Updated: 03/04/24 1508    Hemoglobin A1c [[id number]]  (Normal) Collected: 03/04/24 1129    Specimen: Blood Updated: 03/04/24 1459     Hemoglobin A1C 5.50 %     Lipase [[id number]]  (Normal) Collected: 03/04/24 1129    Specimen: Blood Updated: 03/04/24 1453     Lipase 36 U/L     TSH [[id number]]  (Normal) Collected: 03/04/24 1129    Specimen: Blood Updated: 03/04/24 1412     TSH 1.760 uIU/mL     T4, Free [[id number]]  (Normal) Collected: 03/04/24 1129    Specimen: Blood Updated: 03/04/24 1412     Free T4 1.49 ng/dL     Narrative:      Results may be falsely increased if patient taking Biotin.      Lipid Panel [[id number]]  (Abnormal) Collected: 03/04/24 1129    Specimen: Blood Updated: 03/04/24 1345     Total Cholesterol 255 mg/dL      Triglycerides 472 mg/dL      HDL Cholesterol 37 mg/dL      LDL Cholesterol  132 mg/dL      VLDL Cholesterol 86 mg/dL      LDL/HDL Ratio 3.34    Narrative:      Cholesterol Reference Ranges  (U.S. Department of Health and Human Services ATP  III Classifications)    Desirable          <200 mg/dL  Borderline High    200-239 mg/dL  High Risk          >240 mg/dL      Triglyceride Reference Ranges  (U.S. Department of Health and Human Services ATP III Classifications)    Normal           <150 mg/dL  Borderline High  150-199 mg/dL  High             200-499 mg/dL  Very High        >500 mg/dL    HDL Reference Ranges  (U.S. Department of Health and Human Services ATP III Classifications)    Low     <40 mg/dl (major risk factor for CHD)  High    >60 mg/dl ('negative' risk factor for CHD)        LDL Reference Ranges  (U.S. Department of Health and Human Services ATP III Classifications)    Optimal          <100 mg/dL  Near Optimal     100-129 mg/dL  Borderline High  130-159 mg/dL  High             160-189 mg/dL  Very High        >189 mg/dL    Kim Draw [[id number]] Collected: 03/04/24 1129    Specimen: Blood Updated: 03/04/24 1230    Narrative:      The following orders were created for panel order Kim Draw.  Procedure                               Abnormality         Status                     ---------                               -----------         ------                     Green Top (Gel)[[id number]]                                  Final result               Lavender Top[[id number]]                                     Final result               Gold Top - SST[[id number]]                                   Final result               Light Blue Top[[id number]]                                   Final result                 Please view results for these tests on the individual orders.    Green Top (Gel) [[id number]] Collected: 03/04/24 1129    Specimen: Blood Updated: 03/04/24 1230     Extra Tube Hold for add-ons.     Comment: Auto resulted.       Lavender Top [[id number]] Collected: 03/04/24 1129    Specimen: Blood Updated: 03/04/24 1230     Extra Tube hold for add-on     Comment: Auto resulted       Gold Top - SST [[id number]] Collected: 03/04/24 1129     Specimen: Blood Updated: 03/04/24 1230     Extra Tube Hold for add-ons.     Comment: Auto resulted.       Light Blue Top [[id number]] Collected: 03/04/24 1129    Specimen: Blood Updated: 03/04/24 1230     Extra Tube Hold for add-ons.     Comment: Auto resulted       Comprehensive Metabolic Panel [[id number]]  (Abnormal) Collected: 03/04/24 1129    Specimen: Blood Updated: 03/04/24 1206     Glucose 106 mg/dL      BUN 12 mg/dL      Creatinine 0.74 mg/dL      Sodium 137 mmol/L      Potassium 3.2 mmol/L      Chloride 96 mmol/L      CO2 26.0 mmol/L      Calcium 9.9 mg/dL      Total Protein 8.9 g/dL      Albumin 4.5 g/dL      ALT (SGPT) 124 U/L      AST (SGOT) 258 U/L      Alkaline Phosphatase 130 U/L      Total Bilirubin 0.8 mg/dL      Globulin 4.4 gm/dL      A/G Ratio 1.0 g/dL      BUN/Creatinine Ratio 16.2     Anion Gap 15.0 mmol/L      eGFR 102.5 mL/min/1.73     Narrative:      GFR Normal >60  Chronic Kidney Disease <60  Kidney Failure <15      BNP [[id number]]  (Normal) Collected: 03/04/24 1129    Specimen: Blood Updated: 03/04/24 1206     proBNP 246.9 pg/mL     Narrative:      This assay is used as an aid in the diagnosis of individuals suspected of having heart failure. It can be used as an aid in the diagnosis of acute decompensated heart failure (ADHF) in patients presenting with signs and symptoms of ADHF to the emergency department (ED). In addition, NT-proBNP of <300 pg/mL indicates ADHF is not likely.    Age Range Result Interpretation  NT-proBNP Concentration (pg/mL:      <50             Positive            >450                   Gray                 300-450                    Negative             <300    50-75           Positive            >900                  Gray                300-900                  Negative            <300      >75             Positive            >1800                  Gray                300-1800                  Negative            <300    High Sensitivity Troponin T [[id number]]   (Normal) Collected: 03/04/24 1129    Specimen: Blood Updated: 03/04/24 1206     HS Troponin T <6 ng/L     Narrative:      High Sensitive Troponin T Reference Range:  <14.0 ng/L- Negative Female for AMI  <22.0 ng/L- Negative Male for AMI  >=14 - Abnormal Female indicating possible myocardial injury.  >=22 - Abnormal Male indicating possible myocardial injury.   Clinicians would have to utilize clinical acumen, EKG, Troponin, and serial changes to determine if it is an Acute Myocardial Infarction or myocardial injury due to an underlying chronic condition.         CBC & Differential [[id number]]  (Normal) Collected: 03/04/24 1129    Specimen: Blood Updated: 03/04/24 1151    Narrative:      The following orders were created for panel order CBC & Differential.  Procedure                               Abnormality         Status                     ---------                               -----------         ------                     CBC Auto Differential[[id number]]        Normal              Final result                 Please view results for these tests on the individual orders.    CBC Auto Differential [[id number]]  (Normal) Collected: 03/04/24 1129    Specimen: Blood Updated: 03/04/24 1151     WBC 9.60 10*3/mm3      RBC 4.51 10*6/mm3      Hemoglobin 14.7 g/dL      Hematocrit 42.6 %      MCV 94.5 fL      MCH 32.5 pg      MCHC 34.4 g/dL      RDW 13.2 %      RDW-SD 46.4 fl      MPV 7.6 fL      Platelets 270 10*3/mm3      Neutrophil % 67.7 %      Lymphocyte % 22.5 %      Monocyte % 6.6 %      Eosinophil % 2.1 %      Basophil % 1.1 %      Neutrophils, Absolute 6.50 10*3/mm3      Lymphocytes, Absolute 2.20 10*3/mm3      Monocytes, Absolute 0.60 10*3/mm3      Eosinophils, Absolute 0.20 10*3/mm3      Basophils, Absolute 0.10 10*3/mm3      nRBC 0.0 /100 WBC             Imaging Results (Most Recent)       Procedure Component Value Units Date/Time    XR Chest 1 View [[id number]] Collected: 03/04/24 1154     Updated: 03/04/24 1157     Narrative:      XR CHEST 1 VW    Date of Exam: 3/4/2024 11:47 AM EST    Indication: chest pain    Comparison: None available.    Findings:  Heart and pulmonary vessels mediastinal contours are within normal limits. Lung fields are clear. There are no effusions. There is mild elevation of the right diaphragm.      Impression:      Impression:  No acute process.      Electronically Signed: Anh Kraft MD    3/4/2024 11:54 AM EST    Workstation ID: [id number]          reviewed    ECG/EMG Results (most recent)       Procedure Component Value Units Date/Time    Telemetry Scan [[id number]] Resulted: 03/04/24     Updated: 03/05/24 0546    Telemetry Scan [[id number]] Resulted: 03/04/24     Updated: 03/05/24 0546    ECG 12 Lead Chest Pain [[id number]] Collected: 03/04/24 1130     Updated: 03/05/24 0932     QT Interval 401 ms      QTC Interval 470 ms     Narrative:      HEART RATE= 82  bpm  RR Interval= 728  ms  IA Interval= 120  ms  P Horizontal Axis= 173  deg  P Front Axis= -89  deg  QRSD Interval= 89  ms  QT Interval= 401  ms  QTcB= 470  ms  QRS Axis= -20  deg  T Wave Axis= 14  deg  - BORDERLINE ECG -  Ectopic atrial rhythm  No previous ECG available for comparison  Electronically Signed By: Young Helton ([facility]) 05-Mar-2024 09:31:50  Date and Time of Study: 2024-03-04 11:30:26          reviewed            Microbiology Results (last 10 days)       ** No results found for the last 240 hours. **            Assessment & Plan     Chest pain     Chest pain  Lab Results   Component Value Date    TROPONINT <6 03/04/2024    TROPONINT <6 03/04/2024   -Thyroid panel WNL  -A1C 5.5  .9  -Chest X-ray: No acute cardiopulmonary process seen  -EKG: Atrial rhythm with PACs, sinus rhythm 1 unit  -Stress Test ordered showing no ischemia   -Telemetry  -NPO    Transaminitis   Lab Results   Component Value Date    WBC 6.90 03/05/2024     (H) 03/05/2024    ALT 89 (H) 03/05/2024    ALKPHOS 103 03/05/2024    BILITOT 1.0 03/05/2024     LIPASE 36 03/04/2024   -Likely contributing to daily alcohol use, cessation encouraged  -CIWA protocol   -Admission Alk phos 130, , ALT 89  -Continued on IV fluids  -UDS negative     Hypertension  -Poorly Controlled   BP Readings from Last 1 Encounters:   03/05/24 156/89   - Continue losartan, hydrochlorothiazide  -Add Norvasc  - Monitor while admitted    GERD  -Continue PPI     Hyperlipidemia  -Start statin  -Total cholesterol 255, HDL 37,  , VLDL 86, triglycerides 472      I discussed the patients findings and my recommendations with patient.     Discharge Diagnosis:      Chest pain      Hospital Course  Patient is a 43 y.o. female presented with chest pain.  Patient presented with chest pain and palpitations that started yesterday suddenly with shortness of breath and dizziness. Patient states she does take blood pressure medications but no recent medication changes recently. Patient does report that she drinks daily mainly due to stress from multiple jobs. Patient states she has quit one job to help with stress.  Admission alkaline phosphate was 138 with decreased to 103, AST decreased from 258 to 157, and ALT decreased from 124 to 89. CIWA protocol initiated. Thyroid panel WNL. A1C 5.3. .9. Chest X-ray showed no acute cardiopulmonary process seen. EKG showed atrial rhythm with PACs, sinus rhythm on unit. Stress Test ordered showing no ischemia.  Patient to monitor blood pressures at home and take medication as prescribed.  Patient to follow-up with new PCP in 1 to 2 weeks.  Patient to sustain from alcohol use.  Test and recommendations reviewed patient and she agreed to treatment plan.  If symptoms worsen patient to call 911 or go to nearest ED.    Past Medical History:     Past Medical History:   Diagnosis Date    Anxiety     Asthma     Depression     GERD (gastroesophageal reflux disease)     Headache     Headache, tension-type     Menstrual problem     Migraine        Past Surgical  History:     Past Surgical History:   Procedure Laterality Date    CHOLECYSTECTOMY      CYSTOSCOPY W/ URETERAL STENT PLACEMENT Right 12/2/2016    Procedure: CYSTOSCOPY RIGHT  URETERAL CATHETER/STENT INSERTION BASKET EXTRACTION, LASER LITHOTRIPSY  ;  Surgeon: Christophe Doe MD;  Location: [facility];  Service:     LAPAROSCOPIC TOTAL HYSTERECTOMY  08/2017       Social History:   Social History     Socioeconomic History    Marital status:    Tobacco Use    Smoking status: Never   Vaping Use    Vaping status: Never Used   Substance and Sexual Activity    Alcohol use: Yes    Drug use: No    Sexual activity: Yes       Procedures Performed         Consults:   Consults       No orders found for last 30 day(s).            Condition on Discharge:     Stable    Discharge Disposition  Home or Self Care    Discharge Medications     Discharge Medications        New Medications        Instructions Start Date   amLODIPine 5 MG tablet  Commonly known as: NORVASC   5 mg, Oral, Every 24 Hours Scheduled      atorvastatin 40 MG tablet  Commonly known as: LIPITOR   40 mg, Oral, Nightly      folic acid 1 MG tablet  Commonly known as: FOLVITE   1 mg, Oral, Daily   Start Date: March 6, 2024     hydrOXYzine 25 MG tablet  Commonly known as: ATARAX   25 mg, Oral, 3 Times Daily PRN      thiamine 100 MG tablet  Commonly known as: VITAMIN B1   100 mg, Oral, Daily   Start Date: March 9, 2024            Continue These Medications        Instructions Start Date   losartan-hydrochlorothiazide 100-25 MG per tablet  Commonly known as: HYZAAR   0.5 tablets, Oral, Daily      omeprazole 20 MG capsule  Commonly known as: priLOSEC   20 mg, Oral, Daily               Discharge Diet:   Diet Instructions       Diet: Cardiac Diets; Healthy Heart (2-3 Na+); Regular (IDDSI 7); Thin (IDDSI 0)      Discharge Diet: Cardiac Diets    Cardiac Diet: Healthy Heart (2-3 Na+)    Texture: Regular (IDDSI 7)    Fluid Consistency: Thin (IDDSI 0)             Activity at Discharge:   Activity Instructions       Activity as Tolerated      Measure Blood Pressure              Follow-up Appointments  No future appointments.  Additional Instructions for the Follow-ups that You Need to Schedule       Discharge Follow-up with PCP   As directed       Currently Documented PCP:    Provider, No Known    PCP Phone Number:    None     Follow Up Details: 7-10 days                Test Results Pending at Discharge  Pending Labs       Order Current Status    Ethanol, Urine - Urine, Clean Catch In process             Risk for Readmission (LACE) Score: 1 (3/5/2024  6:00 AM)          BARRY Gilmore  03/05/24  10:33 EST

## 2024-03-05 NOTE — CASE MANAGEMENT/SOCIAL WORK
Discharge Planning Assessment   Jf     Patient Name: Ru Rico  MRN: [id number]  Today's Date: 3/5/2024    Admit Date: 3/4/2024    Plan: Routine home   Discharge Needs Assessment       Row Name 03/05/24 1349       Living Environment    People in Home spouse;child(juan antonio), dependent    Name(s) of People in Home  Charlie and 10 y/o son    Current Living Arrangements home    Potentially Unsafe Housing Conditions none    In the past 12 months has the electric, gas, oil, or water company threatened to shut off services in your home? No    Primary Care Provided by self    Provides Primary Care For child(juan antonio)    Family Caregiver if Needed spouse    Family Caregiver Names  Charlie    Quality of Family Relationships helpful;involved;supportive    Able to Return to Prior Arrangements yes       Resource/Environmental Concerns    Resource/Environmental Concerns none    Transportation Concerns none       Transportation Needs    In the past 12 months, has lack of transportation kept you from medical appointments or from getting medications? no    In the past 12 months, has lack of transportation kept you from meetings, work, or from getting things needed for daily living? No       Food Insecurity    Within the past 12 months, you worried that your food would run out before you got the money to buy more. Never true    Within the past 12 months, the food you bought just didn't last and you didn't have money to get more. Never true       Transition Planning    Patient/Family Anticipates Transition to home with family    Patient/Family Anticipated Services at Transition none    Transportation Anticipated car, drives self;family or friend will provide       Discharge Needs Assessment    Readmission Within the Last 30 Days no previous admission in last 30 days    Equipment Currently Used at Home bp cuff    Concerns to be Addressed denies needs/concerns at this time    Anticipated Changes Related to Illness none     Equipment Needed After Discharge none                   Discharge Plan       Row Name 03/05/24 1350       Plan    Plan Routine home    Plan Comments CM met with patient at the bedside. Confirmed patient is self-pay at this time and over income for Medicaid and pharmacy. Patient agreeable for CM to arrange PCP appointment-will   call [facility] in [place] and call patient to inform of appointment details. Patient denies any difficulty affording medications. Patient is not current with any HHC/OPPT/OT services. Patient lives at home with spouse and son, is IADLS, and drives.Patient will provide own transport. DC Barriers: myoview WNL, patient will DC now.                  Continued Care and Services - Discharged on 3/5/2024 Admission date: 3/4/2024 - Discharge disposition: Home or Self Care   No active coordination exists for this encounter.       Expected Discharge Date and Time       Expected Discharge Date Expected Discharge Time    Mar 5, 2024            Demographic Summary       Row Name 03/05/24 1347       General Information    Admission Type observation    Arrived From emergency department    Referral Source admission list    Reason for Consult discharge planning    Preferred Language English       Contact Information    Permission Granted to Share Info With     Contact Information Obtained for                    Functional Status       Row Name 03/05/24 1348       Functional Status    Usual Activity Tolerance good    Current Activity Tolerance good       Functional Status, IADL    Medications independent    Meal Preparation independent    Housekeeping independent    Laundry independent    Shopping independent       Mental Status    General Appearance WDL WDL       Mental Status Summary    Recent Changes in Mental Status/Cognitive Functioning no changes           Gerhard Mohr RN     Cell number [phone]  Office number [phone]

## 2024-03-05 NOTE — PLAN OF CARE
Goal Outcome Evaluation:  Plan of Care Reviewed With: patient        Progress: improving  Outcome Evaluation: Patient had negative stress test. Discharging home with new medications, instructions provided. No complaints at discharge.

## 2024-03-05 NOTE — CASE MANAGEMENT/SOCIAL WORK
Social Work Assessment  [facility]     Patient Name: Ru Rico  MRN: [id number]  Today's Date: 3/5/2024    Admit Date: 3/4/2024         Discharge Plan       Row Name 03/05/24 1601       Plan    Plan Comments Sw met with Pt at bedside to complete MH/CD screen. Pt lives at home with her  and 10 year old son, Diego. Pt reported that she drinks after son is asleep. Pt's son lives with Pt half the time and his dad half the time. Pt reported they have a great relationship. Pt is able to pay bills and afford food, medications. Pt started a new job but had to come to hospital to have her heart checked. Pt reported that she started drinking heavily during the pandemic. 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 Pt does not see a therapist or pyschiatrist. Pt mentioned having panic attacks and depression symptoms. Pt interested in resources for ETOH cessation and therapy to help with anxiety and depression. Pt is wanting to follow up OP for cessation. Jennifer provided Pt with a list of treatment facilities and local therapists in the area. No further needs identified.      MYLES Hines, MSW    Phone: [phone]  Fax: [phone]  Email: [email or web address]

## 2024-03-05 NOTE — PLAN OF CARE
Goal Outcome Evaluation:  Plan of Care Reviewed With: patient        Progress: no change  Outcome Evaluation: pt denies cp. CIWA scores have been negative. Ativan given routine so far this nurses shift. No s/s of distress. Pt remains in seizure precautions.

## 2024-03-06 NOTE — DISCHARGE PLACEMENT REQUEST
"Ru Rico (43 y.o. Female)       Date of Birth   1980    Social Security Number       Address   [address] [place] [address]    Home Phone   [phone]    MRN   [id number]       Tenriism   None    Marital Status                               Admission Date   3/4/24    Admission Type   Emergency    Admitting Provider   Young Helton MD    Attending Provider       Department, Room/Bed   [facility] OBSERVATION, 111/1       Discharge Date   3/5/2024    Discharge Disposition   Home or Self Care    Discharge Destination   Home                              Attending Provider: (none)   Allergies: No Known Allergies    Isolation: None   Infection: None   Code Status: Prior    Ht: 170.2 cm (67\")   Wt: 90.7 kg (200 lb)    Admission Cmt: None   Principal Problem: Chest pain [R07.9]                   Active Insurance as of 3/4/2024       Patient has no active insurance coverage on file for 3/4/2024.            Emergency Contacts        (Rel.) Home Phone Work Phone Mobile Phone    NATHANIEL LEIJA (Spouse) -- -- [phone]                 Discharge Summaryl        Josemanuel Magallon at 03/05/24 1150          Social Work Assessment  [facility]     Patient Name: Ru Rico  MRN: [id number]  Today's Date: 3/5/2024    Admit Date: 3/4/2024         Discharge Plan       Row Name 03/05/24 1601       Plan    Plan Comments Sw met with Pt at bedside to complete MH/CD screen. Pt lives at home with her  and 10 year old son, Diego. Pt reported that she drinks after son is asleep. Pt's son lives with Pt half the time and his dad half the time. Pt reported they have a great relationship. Pt is able to pay bills and afford food, medications. Pt started a new job but had to come to hospital to have her heart checked. Pt reported that she started drinking heavily during the pandemic. 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 Pt does not see a therapist or " pyschiatrist. Pt mentioned having panic attacks and depression symptoms. Pt interested in resources for ETOH cessation and therapy to help with anxiety and depression. Pt is wanting to follow up OP for cessation. Sw provided Pt with a list of treatment facilities and local therapists in the area. No further needs identified.      MYLES Hines, MSW    Phone: [phone]  Fax: [phone]  Email: [email or web address]               Electronically signed by Josemanuel Magallon at 03/05/24 1610       Gerhard Mohr, RN at 03/05/24 1150          Discharge Planning Assessment  [facility]     Patient Name: Ru Rico  MRN: [id number]  Today's Date: 3/5/2024    Admit Date: 3/4/2024    Plan: Routine home   Discharge Needs Assessment       Row Name 03/05/24 1349       Living Environment    People in Home spouse;child(juan antonio), dependent    Name(s) of People in Home  Charlie and 10 y/o son    Current Living Arrangements home    Potentially Unsafe Housing Conditions none    In the past 12 months has the electric, gas, oil, or water company threatened to shut off services in your home? No    Primary Care Provided by self    Provides Primary Care For child(juan antonio)    Family Caregiver if Needed spouse    Family Caregiver Names  Charlie    Quality of Family Relationships helpful;involved;supportive    Able to Return to Prior Arrangements yes       Resource/Environmental Concerns    Resource/Environmental Concerns none    Transportation Concerns none       Transportation Needs    In the past 12 months, has lack of transportation kept you from medical appointments or from getting medications? no    In the past 12 months, has lack of transportation kept you from meetings, work, or from getting things needed for daily living? No       Food Insecurity    Within the past 12 months, you worried that your food would run out before you got the money to buy more. Never true    Within the past 12 months,  the food you bought just didn't last and you didn't have money to get more. Never true       Transition Planning    Patient/Family Anticipates Transition to home with family    Patient/Family Anticipated Services at Transition none    Transportation Anticipated car, drives self;family or friend will provide       Discharge Needs Assessment    Readmission Within the Last 30 Days no previous admission in last 30 days    Equipment Currently Used at Home bp cuff    Concerns to be Addressed denies needs/concerns at this time    Anticipated Changes Related to Illness none    Equipment Needed After Discharge none                   Discharge Plan       Row Name 03/05/24 1350       Plan    Plan Routine home    Plan Comments CM met with patient at the bedside. Confirmed patient is self-pay at this time and over income for Medicaid and pharmacy. Patient agreeable for CM to arrange PCP appointment-will   call [facility] in [place] and call patient to inform of appointment details. Patient denies any difficulty affording medications. Patient is not current with any HHC/OPPT/OT services. Patient lives at home with spouse and son, is IADLS, and drives.Patient will provide own transport. DC Barriers: myoview WNL, patient will DC now.                  Continued Care and Services - Discharged on 3/5/2024 Admission date: 3/4/2024 - Discharge disposition: Home or Self Care   No active coordination exists for this encounter.       Expected Discharge Date and Time       Expected Discharge Date Expected Discharge Time    Mar 5, 2024            Demographic Summary       Row Name 03/05/24 1347       General Information    Admission Type observation    Arrived From emergency department    Referral Source admission list    Reason for Consult discharge planning    Preferred Language English       Contact Information    Permission Granted to Share Info With     Contact Information Obtained for                     Functional Status       Row Name 03/05/24 1348       Functional Status    Usual Activity Tolerance good    Current Activity Tolerance good       Functional Status, IADL    Medications independent    Meal Preparation independent    Housekeeping independent    Laundry independent    Shopping independent       Mental Status    General Appearance WDL WDL       Mental Status Summary    Recent Changes in Mental Status/Cognitive Functioning no changes           Gerhard Mohr, RN     Cell number [phone]  Office number [phone]       Electronically signed by Gerhard Mohr, RN at 03/05/24 6813       Lynda Sagastume RN at 03/05/24 1129          Goal Outcome Evaluation:  Plan of Care Reviewed With: patient        Progress: improving  Outcome Evaluation: Patient had negative stress test. Discharging home with new medications, instructions provided. No complaints at discharge.                                 Electronically signed by Lynda Sagastume RN at 03/05/24 1128       Leslie Ritter RN at 03/05/24 0317          Goal Outcome Evaluation:  Plan of Care Reviewed With: patient        Progress: no change  Outcome Evaluation: pt denies cp. CIWA scores have been negative. Ativan given routine so far this nurses shift. No s/s of distress. Pt remains in seizure precautions.                                 Electronically signed by Leslie Ritter RN at 03/05/24 0317       Saba Sawant RN at 03/04/24 9940          Goal Outcome Evaluation:  Plan of Care Reviewed With: patient, spouse        Progress: improving  Outcome Evaluation: Pt admitted for chest pain, upon further review of admission, patient does appear to be having some alcoholic withdrawal symptoms; placed on seizure precations and bed rails are padded. Troponins negative, EKG shows ectopic atrial rhythm, chest xray negative. To have stress test in the AM; NPO at midnight. Potassium is 3.2; being replaced, redraw later this  "evening. Pt A&O x4, room air, up ad simi. Urine toxicology negative. Patient is anxious with headache and some mild sweating. Pt given 2 mg PO ativan. Significant other at bedside. No complaints at this time.                                 Electronically signed by Saba Sawant RN at 03/04/24 1708       Stephanie Mario RN at 03/04/24 1346          Messaged provider for [facility] protocol.     Electronically signed by Stephanie Mario RN at 03/04/24 1346       Stephanie Mario RN at 03/04/24 1319          Patient does not want the valium at the moment. RN and patient discussed withdrawal sx and whenever that she is ready or feels like she needs any medication to help with any anxiousness, restlessness, shaking, or irritability to let us know.     Electronically signed by Stephanie Mario RN at 03/04/24 1321       Carmen Barraza APRN at 03/04/24 1311       Attestation signed by Young Helton MD at 03/05/24 1538        SHARED APC NON FACE TO FACE: I performed a substantive part of the MDM during the patient's E/M visit. I personally made or approved the documented management plan and acknowledge its risk of complications.   Young Helton MD 3/5/2024 15:38 EST                         Subjective  History of Present Illness  43-year-old  female presents to the emergency room with complaint of sudden onset of \"chest flutters\", chest heaviness shortness of breath and dizziness.  She states that she was out running errands when this happened about an hour ago.  Patient states that she takes omeprazole and some vitamins and then is also prescribed losartan/HCTZ.  Patient states that she has never had the symptoms before.  Patient reports that her gallbladder was removed in 2016.      Review of Systems   Respiratory:  Positive for chest tightness and shortness of breath.    Cardiovascular:  Positive for chest pain and palpitations.   Gastrointestinal:  Positive for diarrhea.   Neurological:  Positive for dizziness. "       Past Medical History:   Diagnosis Date    Anxiety     Asthma     Depression     GERD (gastroesophageal reflux disease)     Headache     Headache, tension-type     Menstrual problem     Migraine        No Known Allergies    Past Surgical History:   Procedure Laterality Date    CHOLECYSTECTOMY      CYSTOSCOPY W/ URETERAL STENT PLACEMENT Right 12/2/2016    Procedure: CYSTOSCOPY RIGHT  URETERAL CATHETER/STENT INSERTION BASKET EXTRACTION, LASER LITHOTRIPSY  ;  Surgeon: Christophe Doe MD;  Location: [facility];  Service:     LAPAROSCOPIC TOTAL HYSTERECTOMY  08/2017       Family History   Problem Relation Age of Onset    Heart disease Mother     Hypertension Mother     Depression Mother     Anxiety disorder Mother     Bipolar disorder Brother     Heart disease Maternal Grandmother     Hypertension Maternal Grandmother     Cancer Maternal Grandmother     Thyroid disease Maternal Grandmother     Lung disease Maternal Grandmother     Depression Maternal Grandmother     Glaucoma Maternal Grandmother     Anxiety disorder Maternal Grandmother     Macular degeneration Maternal Grandmother        Social History     Socioeconomic History    Marital status:    Tobacco Use    Smoking status: Never   Substance and Sexual Activity    Alcohol use: Yes    Drug use: No           Objective  Physical Exam  Constitutional:       General: She is in acute distress.      Appearance: She is diaphoretic. She is not ill-appearing or toxic-appearing.   HENT:      Head: Normocephalic and atraumatic.      Mouth/Throat:      Mouth: Mucous membranes are moist.      Pharynx: Oropharynx is clear.   Eyes:      Extraocular Movements: Extraocular movements intact.      Conjunctiva/sclera: Conjunctivae normal.      Pupils: Pupils are equal, round, and reactive to light.   Cardiovascular:      Rate and Rhythm: Normal rate.      Pulses: Normal pulses.   Pulmonary:      Effort: Pulmonary effort is normal.   Musculoskeletal:         General:  Normal range of motion.      Cervical back: Normal range of motion and neck supple.   Skin:     General: Skin is warm and dry.      Capillary Refill: Capillary refill takes less than 2 seconds.   Neurological:      General: No focal deficit present.      Mental Status: She is alert and oriented to person, place, and time.   Psychiatric:         Attention and Perception: Attention and perception normal.         Mood and Affect: Mood is anxious. Affect is tearful.         Speech: Speech normal.         Behavior: Behavior normal. Behavior is not aggressive. Behavior is cooperative.         Thought Content: Thought content normal.         Cognition and Memory: Cognition and memory normal.         Judgment: Judgment normal.         Procedures           ED Course  ED Course as of 03/04/24 1318   Mon Mar 04, 2024   1310 Discussed patient case with MALINDA Cowan in ED OBS and agrees to take over care. [CT]      ED Course User Index  [CT] Carmen Barraza APRN      Labs Reviewed   COMPREHENSIVE METABOLIC PANEL - Abnormal; Notable for the following components:       Result Value    Glucose 106 (*)     Potassium 3.2 (*)     Chloride 96 (*)     Total Protein 8.9 (*)     ALT (SGPT) 124 (*)     AST (SGOT) 258 (*)     Alkaline Phosphatase 130 (*)     All other components within normal limits    Narrative:     GFR Normal >60  Chronic Kidney Disease <60  Kidney Failure <15     BNP (IN-HOUSE) - Normal    Narrative:     This assay is used as an aid in the diagnosis of individuals suspected of having heart failure. It can be used as an aid in the diagnosis of acute decompensated heart failure (ADHF) in patients presenting with signs and symptoms of ADHF to the emergency department (ED). In addition, NT-proBNP of <300 pg/mL indicates ADHF is not likely.    Age Range Result Interpretation  NT-proBNP Concentration (pg/mL:      <50             Positive            >450                   Gray                 300-450                     Negative             <300    50-75           Positive            >900                  Gray                300-900                  Negative            <300      >75             Positive            >1800                  Gray                300-1800                  Negative            <300   TROPONIN - Normal    Narrative:     High Sensitive Troponin T Reference Range:  <14.0 ng/L- Negative Female for AMI  <22.0 ng/L- Negative Male for AMI  >=14 - Abnormal Female indicating possible myocardial injury.  >=22 - Abnormal Male indicating possible myocardial injury.   Clinicians would have to utilize clinical acumen, EKG, Troponin, and serial changes to determine if it is an Acute Myocardial Infarction or myocardial injury due to an underlying chronic condition.        CBC WITH AUTO DIFFERENTIAL - Normal   RAINBOW DRAW    Narrative:     The following orders were created for panel order [facility] Draw.  Procedure                               Abnormality         Status                     ---------                               -----------         ------                     Green Top (Gel)[[id number]]                                  Final result               Lavender Top[[id number]]                                     Final result               Gold Top - SST[[id number]]                                   Final result               Light Blue Top[[id number]]                                   Final result                 Please view results for these tests on the individual orders.   HIGH SENSITIVITIY TROPONIN T 2HR   GREEN TOP   LAVENDER TOP   GOLD TOP - SST   LIGHT BLUE TOP   CBC AND DIFFERENTIAL    Narrative:     The following orders were created for panel order CBC & Differential.  Procedure                               Abnormality         Status                     ---------                               -----------         ------                     CBC Auto Differential[[id number]]        Normal              Final  "result                 Please view results for these tests on the individual orders.              HEART Score: 2                    Medications   sodium chloride 0.9 % flush 10 mL (has no administration in time range)   sodium chloride 0.9 % flush 10 mL (has no administration in time range)   prochlorperazine (COMPAZINE) injection 5 mg (has no administration in time range)   diazePAM (VALIUM) injection 5 mg (has no administration in time range)   aspirin chewable tablet 324 mg (324 mg Oral Given 3/4/24 1148)   nitroglycerin (NITROSTAT) SL tablet 0.4 mg (0.4 mg Sublingual Given 3/4/24 1158)   acetaminophen (TYLENOL) tablet 1,000 mg (1,000 mg Oral Given 3/4/24 1312)            XR Chest 1 View    Result Date: 3/4/2024  Impression: No acute process. Electronically Signed: Anh Kraft MD  3/4/2024 11:54 AM EST  Workstation ID: [id number]    Medical Decision Making  43-year-old female presents to the emergency room with complaint of chest pain and \"chest flutters\".    Problems Addressed:  Chest pain, unspecified type: acute illness or injury     Details: Started 1 hour prior to arrival to the ER.  324 mg of aspirin and nitro sublingual was ordered and administered while in ED.    Amount and/or Complexity of Data Reviewed  Labs: ordered.     Details: CBC and x 2 troponins are negative for any acute abnormality.  CMP is remarkable for very mildly decreased potassium but more significant is markedly elevated ALT AST and alk phos.  Patient states that she had her gallbladder removed in 2016.  Patient also states that she is an every day alcohol drinker.  Radiology: ordered.     Details: Chest x-ray is negative for acute    Risk  OTC drugs.  Prescription drug management.  Decision regarding hospitalization.  Risk Details: Placed in ED OBS for chest pain r/o and further eval and care of elevated liver enzymes and alk phos concerning for bile duct abnormality vs ETOH etiology.      Vitals:    03/04/24 1153 03/04/24 1157 " 03/04/24 1216 03/04/24 1301   BP: 134/92 131/86 133/89 141/91   BP Location:       Patient Position:       Pulse: 68 69 64 63   Resp:       Temp:       TempSrc:       SpO2: 96% 96% 94% 91%   Weight:       Height:          Final diagnoses:   Chest pain, unspecified type       ED Disposition  ED Disposition       ED Disposition   Decision to Admit    Condition   --    Comment   --               No follow-up provider specified.       Medication List      No changes were made to your prescriptions during this visit.            Carmen Barraza, BARRY  03/04/24 1318      Electronically signed by Young Helton MD at 03/05/24 1538       Stephanie Mario, RN at 03/04/24 1153          Nitro helping with chest pressure/tightness     Electronically signed by Stephanie Mario, RN at 03/04/24 1152

## 2024-03-06 NOTE — CASE MANAGEMENT/SOCIAL WORK
Case Management Discharge Note      Final Note: Routine home         Selected Continued Care - Discharged on 3/5/2024 Admission date: 3/4/2024 - Discharge disposition: Home or Self Care         Transportation Services  Private: Car    Final Discharge Disposition Code: 01 - home or self-care